# Patient Record
Sex: FEMALE | Race: WHITE | Employment: UNEMPLOYED | URBAN - METROPOLITAN AREA
[De-identification: names, ages, dates, MRNs, and addresses within clinical notes are randomized per-mention and may not be internally consistent; named-entity substitution may affect disease eponyms.]

---

## 2018-04-12 ENCOUNTER — OFFICE VISIT (OUTPATIENT)
Dept: NEUROLOGY | Age: 58
End: 2018-04-12
Payer: MEDICAID

## 2018-04-12 VITALS
DIASTOLIC BLOOD PRESSURE: 79 MMHG | OXYGEN SATURATION: 94 % | SYSTOLIC BLOOD PRESSURE: 126 MMHG | BODY MASS INDEX: 44.65 KG/M2 | HEART RATE: 83 BPM | RESPIRATION RATE: 18 BRPM | HEIGHT: 65 IN | WEIGHT: 268 LBS

## 2018-04-12 DIAGNOSIS — M54.17 L-S RADICULOPATHY: Primary | ICD-10-CM

## 2018-04-12 PROCEDURE — 99213 OFFICE O/P EST LOW 20 MIN: CPT | Performed by: NURSE PRACTITIONER

## 2018-04-12 RX ORDER — PREGABALIN 100 MG/1
100 CAPSULE ORAL 2 TIMES DAILY
Qty: 60 CAPSULE | Refills: 2 | Status: SHIPPED | OUTPATIENT
Start: 2018-04-12 | End: 2018-08-28 | Stop reason: SDUPTHER

## 2018-06-14 ENCOUNTER — TELEPHONE (OUTPATIENT)
Dept: NEUROLOGY | Age: 58
End: 2018-06-14

## 2018-07-12 ENCOUNTER — OFFICE VISIT (OUTPATIENT)
Dept: SURGERY | Age: 58
End: 2018-07-12
Payer: MEDICAID

## 2018-07-12 VITALS
DIASTOLIC BLOOD PRESSURE: 72 MMHG | OXYGEN SATURATION: 95 % | RESPIRATION RATE: 16 BRPM | SYSTOLIC BLOOD PRESSURE: 112 MMHG | BODY MASS INDEX: 44.48 KG/M2 | HEART RATE: 87 BPM | HEIGHT: 65 IN | WEIGHT: 267 LBS

## 2018-07-12 DIAGNOSIS — R19.7 DIARRHEA, UNSPECIFIED TYPE: ICD-10-CM

## 2018-07-12 DIAGNOSIS — K21.9 GASTROESOPHAGEAL REFLUX DISEASE WITHOUT ESOPHAGITIS: ICD-10-CM

## 2018-07-12 DIAGNOSIS — K21.9 GASTROESOPHAGEAL REFLUX DISEASE, ESOPHAGITIS PRESENCE NOT SPECIFIED: ICD-10-CM

## 2018-07-12 PROCEDURE — 1036F TOBACCO NON-USER: CPT | Performed by: SURGERY

## 2018-07-12 PROCEDURE — G8417 CALC BMI ABV UP PARAM F/U: HCPCS | Performed by: SURGERY

## 2018-07-12 PROCEDURE — 3017F COLORECTAL CA SCREEN DOC REV: CPT | Performed by: SURGERY

## 2018-07-12 PROCEDURE — G8427 DOCREV CUR MEDS BY ELIG CLIN: HCPCS | Performed by: SURGERY

## 2018-07-12 PROCEDURE — 99202 OFFICE O/P NEW SF 15 MIN: CPT | Performed by: SURGERY

## 2018-07-12 PROCEDURE — 99214 OFFICE O/P EST MOD 30 MIN: CPT | Performed by: SURGERY

## 2018-07-12 RX ORDER — OMEPRAZOLE 40 MG/1
40 CAPSULE, DELAYED RELEASE ORAL DAILY
COMMUNITY
End: 2018-07-12 | Stop reason: SDUPTHER

## 2018-07-12 RX ORDER — IBUPROFEN 800 MG/1
800 TABLET ORAL EVERY 6 HOURS PRN
COMMUNITY

## 2018-07-12 RX ORDER — OMEPRAZOLE 40 MG/1
40 CAPSULE, DELAYED RELEASE ORAL DAILY
Qty: 30 CAPSULE | Refills: 11 | Status: SHIPPED | OUTPATIENT
Start: 2018-07-12 | End: 2019-07-09 | Stop reason: SDUPTHER

## 2018-07-12 RX ORDER — DOCUSATE SODIUM 100 MG/1
200 CAPSULE, LIQUID FILLED ORAL 2 TIMES DAILY
Qty: 120 CAPSULE | Refills: 11 | Status: SHIPPED | OUTPATIENT
Start: 2018-07-12 | End: 2018-08-11

## 2018-07-12 NOTE — PROGRESS NOTES
[address] [facility]    General Surgery Attending History and Physical    Patient's Name/Date of Birth: Eduardo Pollock 1960 (45 y.o.)    Date: 2018     CC: chronic GERD    HPI:  60 yo complains of GERD that is long standing. She notes that spicy foods makes the pain worse. The patient reported  Acute, intermittent epigastric pain localized to the abdomen that started years ago. The intensity of the pain is severe when the pain occurs. There are no alleviating   factors regarding the pain. She only takes prilosec 40 mg intermittently but it helps when she takes it. She last had an upper endoscopy in . She has intermittent constipation. She takes over the counter medications occaasionaly. She alternates between constipation and diarrhea. When she is constipated, her hemorrhoids worsen. Past Medical History:   Diagnosis Date    Chronic UTI     Colon polyps [id number]    Tubular adenoma in     Hyperlipidemia     Hypertension     Hypothyroidism     PONV (postoperative nausea and vomiting)     after epidural- nausea, vomiting and headache    Type II or unspecified type diabetes mellitus without mention of complication, not stated as uncontrolled     NO MED    Urinary incontinence        Past Surgical History:   Procedure Laterality Date    ANKLE SURGERY      fracture  pins used    ANKLE SURGERY      times 3 rt ankle    BREAST SURGERY      reduction and reconstruction both breasts     SECTION      CHOLECYSTECTOMY      COLONOSCOPY      Diverticulosis, one polyp - tubular adenoma. Gets every 2 years     COLONOSCOPY      Polyps.  Needs reepat in     COLONOSCOPY  14    LUMBAR LAMINECTOMY  2016    L4-5    TUBAL LIGATION      UPPER GASTROINTESTINAL ENDOSCOPY      Hiatal hernia    UPPER GASTROINTESTINAL ENDOSCOPY  2012    Hiatal hernia    UPPER GASTROINTESTINAL ENDOSCOPY  14    UPPER GASTROINTESTINAL ENDOSCOPY  4/6/15    
Aidee Gallardo is scheduled for an EGD & Colonoscopy with Dr Delvis Masterson on 7/26/18 @ 10:00am @ [facility]. Patient has been notified of the appointment and a letter has been mailed and/or given to patient in clinic. Patient has also been given verbal instruction to stop blood thinners 5 days before, take blood pressure medicine the morning before with a small sip of water. Patient has been told to make sure they have a ride and to park in the [facility] and report through the outpatient entrance of the hospital facing [place] for same day surgery.   .Electronically signed by Murtaza Garcia on 7/12/18 at 1:38 PM
 COLONOSCOPY  2012    Polyps. Needs reepat in 2014    COLONOSCOPY  4/16/14    LUMBAR LAMINECTOMY  03/22/2016    L4-5    TUBAL LIGATION      UPPER GASTROINTESTINAL ENDOSCOPY  2008    Hiatal hernia    UPPER GASTROINTESTINAL ENDOSCOPY  2012    Hiatal hernia    UPPER GASTROINTESTINAL ENDOSCOPY  4/16/14    UPPER GASTROINTESTINAL ENDOSCOPY  4/6/15    DR Patricia Caceres       Prior to Admission medications    Medication Sig Start Date End Date Taking? Authorizing Provider   omeprazole (PRILOSEC) 40 MG delayed release capsule Take 40 mg by mouth daily   Yes Historical Provider, MD   ibuprofen (ADVIL;MOTRIN) 800 MG tablet Take 800 mg by mouth every 6 hours as needed for Pain   Yes Historical Provider, MD   pregabalin (LYRICA) 100 MG capsule Take 1 capsule by mouth 2 times daily for 90 days. . 4/12/18 7/12/18 Yes RIK Garcia - CNP   furosemide (LASIX) 20 MG tablet Take 20 mg by mouth daily   Yes Historical Provider, MD   metFORMIN (GLUCOPHAGE) 500 MG tablet Take 500 mg by mouth 2 times daily (with meals)   Yes Historical Provider, MD   levothyroxine (SYNTHROID) 100 MCG tablet Take 1 tablet by mouth daily. Take with water on an empty stomach- wait 30 minutes before eating or taking other meds. 4/11/14  Yes Deepti Frias MD   enalapril (VASOTEC) 10 MG tablet Take 1 tablet by mouth daily.  4/11/14  Yes Eryn Osei MD       No Known Allergies    Family History   Problem Relation Age of Onset    Diabetes Mother     High Blood Pressure Mother     Thyroid Disease Mother     Diabetes Father     Heart Disease Father     Thyroid Disease Daughter     Seizures Daughter        Social History   Substance Use Topics    Smoking status: Never Smoker    Smokeless tobacco: Never Used    Alcohol use No         Review of Systems - History obtained from the patient  General ROS: see above  Psychological ROS: negative  Ophthalmic ROS: negative  ENT ROS: negative  Allergy and Immunology ROS:

## 2018-07-12 NOTE — PATIENT INSTRUCTIONS
pressure medications or heart medications, you should take them with a sip of water.  If you are on INSULIN or OTHER DIABETIC MEDICATIONS then check with your primary care physician as to how to adjust your medication while on clear liquid diet and when nothing by mouth. Instructions for Clear liquid diet  Definition  A clear liquid diet consists of clear liquids, such as water, broth and plain gelatin, that are easily digested and leave no undigested residue in your intestinal tract. Your doctor may prescribe a clear liquid diet before certain medical procedures or if you have certain digestive problems. Because a clear liquid diet can't provide you with adequate calories and nutrients, it shouldn't be continued for more than a few days. Purpose  A clear liquid diet is often used before tests, procedures or surgeries that require no food in your stomach or intestines, such as before colonoscopy. It may also be recommended as a short-term diet if you have certain digestive problems, such as nausea, vomiting or diarrhea, or after certain types of surgery. Diet details  A clear liquid diet helps maintain adequate hydration, provides some important electrolytes, such as sodium and potassium, and gives some energy at a time when a full diet isn't possible or recommended. The following foods are allowed in a clear liquid diet:   Plain water   Fruit juices without pulp, such as apple juice, grape juice or cranberry juice   Strained lemonade or fruit punch   Clear, fat-free broth (bouillon or consomme)   Clear sodas   Plain gelatin   Honey   Ice pops without bits of fruit or fruit pulp   Tea or coffee without milk or cream    Any foods not on the above list should be avoided. Also, for certain tests, such as colon exams, your doctor may ask you to avoid liquids or gelatin with red coloring.      A typical menu on the clear liquid diet may look like this:     Breakfast:  1 glass fruit juice  1 cup coffee or Difficulty swallowing   Blood in stool or vomit   Abnormal x-ray or other examinations of the gastrointestinal tract     Conditions that can be diagnosed with upper GI endoscopy include:   Ulcers   Tumors   Polyps   Abnormal narrowing   Inflammation     Possible Complications   Complications are rare, but no procedure is completely free of risk. If you are planning to have upper GI endoscopy, your doctor will review a list of possible complications, which may include:   Bleeding   Damage to the esophagus, stomach, or intestine   Infection   Respiratory depression (reduced breathing rate and/or depth)   Reaction to sedatives or anesthesia causing your blood pressure to drop    Some factors that may increase the risk of complications include:   Age: 61 or older   Pregnancy   Obesity   Smoking , alcoholism , or drug use   Malnutrition   Recent illness   Diabetes   Heart or lung problems   Bleeding disorders   Use of certain medicines     Be sure to discuss these risks with your doctor before the test.     What to Expect   Prior to test   Leading up to the test:   Arrange for a ride home after the test. Also, arrange for help at home. The night before, eat a light meal.  Do not eat or drink anything after midnight the night before the test.   Talk to your doctor about your medicines. You may be asked to stop taking some medicines up to one week before the procedure, like:   Anti-inflammatory drugs (e.g., aspirin )   Blood thinners, like clopidogrel (Plavix) or warfarin (Coumadin)     Description of the Test   You will be asked to lie on your left side. You will have monitors tracking your breathing, heart rate, and blood oxygen levels. You will be given supplemental oxygen to breathe through your nose. A mouthpiece will be positioned to help keep your mouth open. Your throat may be sprayed with a numbing medicine.  You will be given a sedative through an IV to help you relax during the test.  During the test, a small suction tube will be used to clear saliva and fluids from your mouth. The endoscope will be lubricated and placed in your mouth. You will be asked to try to swallow it. Then, it will be carefully and slowly advanced down your throat. It will be passed through your esophagus and into your stomach and intestine. While the endoscope is being advanced, your doctor will view the images on the screen. Air will be passed through the endoscope into your digestive tract. This will be done to smooth the normal folds in the tissues, allowing your doctor to view the tissue more easily. Tiny tools may be passed through the endoscope in order to take biopsies or do other tests. After Test   After the test, you will be observed for an hour. Then, you will be allowed to go home. When you return home after the test, do the following to help ensure a smooth recovery:   Rest when you get home. Ask your doctor if you can resume your normal diet. In most cases, you will be able to. Sedatives can slow your reaction time. Do not drive or use machinery for the rest of the day. Avoid alcohol for the rest of the day. Be sure to follow your doctor's instructions . How Long Will It Take? Usually about 10-15 minutes     Will It Hurt? Most people do not feel anything during the test and will not remember the test.  After the test, your throat may be sore and you may feel bloated. Results   This test gives your doctor information about the health of your digestive system. The results can help to explain your symptoms. You and your doctor will talk about the results and your treatment plan.      Call Your Doctor   After the test, call your doctor if any of the following occurs:   Signs of infection, including fever and chills   Severe abdominal pain   Hard, swollen abdomen   Difficulty swallowing or breathing   Any change or increase in your original symptoms   Bloody or black tarry colored stools   Nausea and/or vomiting   Cough, shortness of breath, or chest pain   Bleeding     In case of emergency, call 911. Colonoscopy     Definition   A colonoscopy is the visual exam of the rectum and colon (large intestine). The exam is done with a tool called a colonoscope. The colonoscope is a flexible tube with a tiny camera on the end. This instrument allows the doctor to view the inside of your rectum and colon. Colonoscopy        [facility].   Reasons for Procedure   It is used to examine, diagnose, and treat problems in your large intestine. The procedure is most often done for the following reasons:   · To determine the cause of abdominal pain, rectal bleeding, or a change in bowel habits   · To detect and treat colon cancer or colon polyps   · To obtain tissue samples for testing   · To stop intestinal bleeding   · Monitor response to treatment if you have inflammatory bowel disease   Possible Complications   Complications are rare, but no procedure is completely free of risk. If you are planning to have a colonoscopy, your doctor will review a list of possible complications, which may include:   · Bleeding   · Perforation or puncture of the bowel   Factors that may increase the risk of complications include:   · Pre-existing heart or kidney condition   · Treatment with certain medicines, including aspirin and other drugs with anticoagulant or blood-thinning properties   · Prior abdominal surgery or radiation treatments   · Active colitis , diverticulitis , or other acute bowel disease   · Previous treatment with radiation therapy   Be sure to discuss these risks with your doctor before the procedure. What to Expect   Prior to Procedure   Your doctor will likely do the following:   · Physical exam   · Health history   · Review of medicines   · Test your stool for hidden blood (called \"occult blood\")   Your colon must be completely clean before the procedure.  Any stool left in the intestine will with the passing of gas. Post-procedure Care   If any tissue was removed:   · It will be sent to a lab to be examined. It may take 1-2 weeks for results. The doctor will usually give an initial report after the scope is removed. Other tests may be recommended. · A small amount of bleeding may occur during the first few days after the procedure. When you return home after the procedure, be sure to follow your doctor's instructions, which may include:   · Resume medicines as instructed by your doctor. · Resume normal diet, unless directed otherwise by your doctor. · The sedative will make you drowsy. Avoid driving, operating machinery, or making important decisions for the rest of the day. · Rest for the remainder of the day. Call Your Doctor   After arriving home, contact your doctor if any of the following occurs:   · Bleeding from your rectumNotify your doctor if you pass a teaspoonful of blood or more. · Black, tarry stools   · Severe abdominal pain   · Hard, swollen abdomen   · Signs of infection, including fever or chills   · Inability to pass gas or stool   · Coughing, shortness of breath, chest pain, severe nausea or vomiting   In case of an emergency, CALL 911 .

## 2018-07-17 ENCOUNTER — TELEPHONE (OUTPATIENT)
Dept: SURGERY | Age: 58
End: 2018-07-17

## 2018-07-26 ENCOUNTER — ANESTHESIA (OUTPATIENT)
Dept: ENDOSCOPY | Age: 58
End: 2018-07-26
Payer: MEDICAID

## 2018-07-26 ENCOUNTER — HOSPITAL ENCOUNTER (OUTPATIENT)
Age: 58
Setting detail: OUTPATIENT SURGERY
Discharge: HOME | End: 2018-07-26
Attending: SURGERY | Admitting: SURGERY
Payer: MEDICAID

## 2018-07-26 ENCOUNTER — ANESTHESIA EVENT (OUTPATIENT)
Dept: ENDOSCOPY | Age: 58
End: 2018-07-26
Payer: MEDICAID

## 2018-07-26 VITALS
SYSTOLIC BLOOD PRESSURE: 128 MMHG | DIASTOLIC BLOOD PRESSURE: 70 MMHG | TEMPERATURE: 98 F | OXYGEN SATURATION: 98 % | HEIGHT: 65 IN | RESPIRATION RATE: 16 BRPM | BODY MASS INDEX: 44.48 KG/M2 | HEART RATE: 64 BPM | WEIGHT: 267 LBS

## 2018-07-26 VITALS — DIASTOLIC BLOOD PRESSURE: 56 MMHG | OXYGEN SATURATION: 95 % | SYSTOLIC BLOOD PRESSURE: 116 MMHG

## 2018-07-26 DIAGNOSIS — Z01.818 PREOP TESTING: Primary | ICD-10-CM

## 2018-07-26 LAB — METER GLUCOSE: 128 MG/DL (ref 70–110)

## 2018-07-26 PROCEDURE — 3700000000 HC ANESTHESIA ATTENDED CARE: Performed by: SURGERY

## 2018-07-26 PROCEDURE — 2580000003 HC RX 258: Performed by: SURGERY

## 2018-07-26 PROCEDURE — 88342 IMHCHEM/IMCYTCHM 1ST ANTB: CPT

## 2018-07-26 PROCEDURE — 6360000002 HC RX W HCPCS

## 2018-07-26 PROCEDURE — 88305 TISSUE EXAM BY PATHOLOGIST: CPT

## 2018-07-26 PROCEDURE — 7100000011 HC PHASE II RECOVERY - ADDTL 15 MIN: Performed by: SURGERY

## 2018-07-26 PROCEDURE — 45378 DIAGNOSTIC COLONOSCOPY: CPT | Performed by: SURGERY

## 2018-07-26 PROCEDURE — 3609009500 HC COLONOSCOPY DIAGNOSTIC OR SCREENING: Performed by: SURGERY

## 2018-07-26 PROCEDURE — 3700000001 HC ADD 15 MINUTES (ANESTHESIA): Performed by: SURGERY

## 2018-07-26 PROCEDURE — 3609012400 HC EGD TRANSORAL BIOPSY SINGLE/MULTIPLE: Performed by: SURGERY

## 2018-07-26 PROCEDURE — 43239 EGD BIOPSY SINGLE/MULTIPLE: CPT | Performed by: SURGERY

## 2018-07-26 PROCEDURE — 82962 GLUCOSE BLOOD TEST: CPT

## 2018-07-26 PROCEDURE — 7100000010 HC PHASE II RECOVERY - FIRST 15 MIN: Performed by: SURGERY

## 2018-07-26 RX ORDER — FENTANYL CITRATE 50 UG/ML
INJECTION, SOLUTION INTRAMUSCULAR; INTRAVENOUS PRN
Status: DISCONTINUED | OUTPATIENT
Start: 2018-07-26 | End: 2018-07-26 | Stop reason: SDUPTHER

## 2018-07-26 RX ORDER — 0.9 % SODIUM CHLORIDE 0.9 %
10 VIAL (ML) INJECTION PRN
Status: DISCONTINUED | OUTPATIENT
Start: 2018-07-26 | End: 2018-07-26 | Stop reason: HOSPADM

## 2018-07-26 RX ORDER — MIDAZOLAM HYDROCHLORIDE 1 MG/ML
INJECTION INTRAMUSCULAR; INTRAVENOUS PRN
Status: DISCONTINUED | OUTPATIENT
Start: 2018-07-26 | End: 2018-07-26 | Stop reason: SDUPTHER

## 2018-07-26 RX ORDER — 0.9 % SODIUM CHLORIDE 0.9 %
10 VIAL (ML) INJECTION PRN
Status: DISCONTINUED | OUTPATIENT
Start: 2018-07-26 | End: 2018-07-26 | Stop reason: SDUPTHER

## 2018-07-26 RX ORDER — 0.9 % SODIUM CHLORIDE 0.9 %
10 VIAL (ML) INJECTION EVERY 12 HOURS SCHEDULED
Status: DISCONTINUED | OUTPATIENT
Start: 2018-07-26 | End: 2018-07-26 | Stop reason: HOSPADM

## 2018-07-26 RX ORDER — SODIUM CHLORIDE 9 MG/ML
INJECTION, SOLUTION INTRAVENOUS CONTINUOUS
Status: DISCONTINUED | OUTPATIENT
Start: 2018-07-26 | End: 2018-07-26 | Stop reason: SDUPTHER

## 2018-07-26 RX ORDER — 0.9 % SODIUM CHLORIDE 0.9 %
10 VIAL (ML) INJECTION EVERY 12 HOURS SCHEDULED
Status: DISCONTINUED | OUTPATIENT
Start: 2018-07-26 | End: 2018-07-26 | Stop reason: SDUPTHER

## 2018-07-26 RX ORDER — PROPOFOL 10 MG/ML
INJECTION, EMULSION INTRAVENOUS PRN
Status: DISCONTINUED | OUTPATIENT
Start: 2018-07-26 | End: 2018-07-26 | Stop reason: SDUPTHER

## 2018-07-26 RX ORDER — SODIUM CHLORIDE 9 MG/ML
INJECTION, SOLUTION INTRAVENOUS CONTINUOUS
Status: DISCONTINUED | OUTPATIENT
Start: 2018-07-26 | End: 2018-07-26 | Stop reason: HOSPADM

## 2018-07-26 RX ADMIN — PROPOFOL 40 MG: 10 INJECTION, EMULSION INTRAVENOUS at 11:39

## 2018-07-26 RX ADMIN — FENTANYL CITRATE 50 MCG: 50 INJECTION, SOLUTION INTRAMUSCULAR; INTRAVENOUS at 11:43

## 2018-07-26 RX ADMIN — FENTANYL CITRATE 50 MCG: 50 INJECTION, SOLUTION INTRAMUSCULAR; INTRAVENOUS at 11:39

## 2018-07-26 RX ADMIN — PROPOFOL 40 MG: 10 INJECTION, EMULSION INTRAVENOUS at 11:54

## 2018-07-26 RX ADMIN — PROPOFOL 40 MG: 10 INJECTION, EMULSION INTRAVENOUS at 11:49

## 2018-07-26 RX ADMIN — PROPOFOL 40 MG: 10 INJECTION, EMULSION INTRAVENOUS at 11:41

## 2018-07-26 RX ADMIN — PROPOFOL 40 MG: 10 INJECTION, EMULSION INTRAVENOUS at 12:04

## 2018-07-26 RX ADMIN — PROPOFOL 40 MG: 10 INJECTION, EMULSION INTRAVENOUS at 12:00

## 2018-07-26 RX ADMIN — MIDAZOLAM HYDROCHLORIDE 2 MG: 1 INJECTION, SOLUTION INTRAMUSCULAR; INTRAVENOUS at 11:39

## 2018-07-26 RX ADMIN — SODIUM CHLORIDE: 9 INJECTION, SOLUTION INTRAVENOUS at 11:35

## 2018-07-26 RX ADMIN — PROPOFOL 40 MG: 10 INJECTION, EMULSION INTRAVENOUS at 11:43

## 2018-07-26 RX ADMIN — PROPOFOL 40 MG: 10 INJECTION, EMULSION INTRAVENOUS at 11:46

## 2018-07-26 ASSESSMENT — PAIN DESCRIPTION - LOCATION: LOCATION: ABDOMEN

## 2018-07-26 ASSESSMENT — PAIN SCALES - GENERAL
PAINLEVEL_OUTOF10: 0
PAINLEVEL_OUTOF10: 0
PAINLEVEL_OUTOF10: 4

## 2018-07-26 ASSESSMENT — PAIN - FUNCTIONAL ASSESSMENT: PAIN_FUNCTIONAL_ASSESSMENT: 0-10

## 2018-07-26 ASSESSMENT — PAIN DESCRIPTION - PAIN TYPE: TYPE: SURGICAL PAIN

## 2018-07-26 ASSESSMENT — LIFESTYLE VARIABLES: SMOKING_STATUS: 0

## 2018-07-26 ASSESSMENT — PAIN DESCRIPTION - DESCRIPTORS: DESCRIPTORS: CRAMPING

## 2018-07-26 ASSESSMENT — PAIN DESCRIPTION - FREQUENCY: FREQUENCY: CONTINUOUS

## 2018-07-26 NOTE — ANESTHESIA PRE PROCEDURE
Department of Anesthesiology  Preprocedure Note       Name:  Deena Tamayo   Age:  59 y.o.  :  1960                                          MRN:  [id number]         Date:  2018      Surgeon: Yael Boyd):  Shayla Lopez MD    Procedure: Procedure(s):  EGD ESOPHAGOGASTRODUODENOSCOPY  COLONOSCOPY LOW  SCREENING    Medications prior to admission:   Prior to Admission medications    Medication Sig Start Date End Date Taking? Authorizing Provider   ibuprofen (ADVIL;MOTRIN) 800 MG tablet Take 800 mg by mouth every 6 hours as needed for Pain   Yes Historical Provider, MD   omeprazole (PRILOSEC) 40 MG delayed release capsule Take 1 capsule by mouth daily 18 Yes Shayla Lopez MD   docusate sodium (COLACE) 100 MG capsule Take 2 capsules by mouth 2 times daily 18 Yes Shayla Lopez MD   pregabalin (LYRICA) 100 MG capsule Take 1 capsule by mouth 2 times daily for 90 days. . 18 Yes RIK Dumont - CNP   furosemide (LASIX) 20 MG tablet Take 20 mg by mouth daily   Yes Historical Provider, MD   metFORMIN (GLUCOPHAGE) 500 MG tablet Take 500 mg by mouth 2 times daily (with meals)   Yes Historical Provider, MD   levothyroxine (SYNTHROID) 100 MCG tablet Take 1 tablet by mouth daily. Take with water on an empty stomach- wait 30 minutes before eating or taking other meds. 14  Yes Deepti Lopez MD   enalapril (VASOTEC) 10 MG tablet Take 1 tablet by mouth daily.  14  Yes Deepti Lopez MD       Current medications:    Current Facility-Administered Medications   Medication Dose Route Frequency Provider Last Rate Last Dose    0.9 % sodium chloride infusion   Intravenous Continuous Shayla Lopez MD        sodium chloride (PF) 0.9 % injection 10 mL  10 mL Intravenous PRN Shayla Lopez MD        sodium chloride (PF) 0.9 % injection 10 mL  10 mL Intravenous 2 times per day Shayla Lopez MD           Allergies:  No Known Allergies    Problem List:    Patient Active Problem List   Diagnosis Code    Hyperlipidemia with target LDL less than 100 E78.5    Type 2 diabetes mellitus (HCC) E11.9    Hypothyroidism E03.9    Colon polyps K63.5    Incontinence of urine R32    Glucose intolerance (impaired glucose tolerance) R73.02    HTN, goal below 140/90 I10    Rib pain on left side R07.81    UTI (urinary tract infection) N39.0    Spinal stenosis M48.00    Multiple adenomatous polyps D36.9    Hiatal hernia K44.9    Gastroesophageal reflux disease without esophagitis K21.9       Past Medical History:        Diagnosis Date    Chronic UTI     Colon polyps [id number]    Tubular adenoma in     Gastroesophageal reflux disease without esophagitis 2018    Hyperlipidemia     Hypertension     Hypothyroidism     PONV (postoperative nausea and vomiting)     after epidural- nausea, vomiting and headache    Type II or unspecified type diabetes mellitus without mention of complication, not stated as uncontrolled     NO MED    Urinary incontinence        Past Surgical History:        Procedure Laterality Date    ANKLE SURGERY      fracture  pins used    ANKLE SURGERY      times 3 rt ankle    BREAST SURGERY      reduction and reconstruction both breasts     SECTION      CHOLECYSTECTOMY      COLONOSCOPY      Diverticulosis, one polyp - tubular adenoma. Gets every 2 years     COLONOSCOPY      Polyps.  Needs reepat in     COLONOSCOPY  14    LUMBAR LAMINECTOMY  2016    L4-5    TUBAL LIGATION      UPPER GASTROINTESTINAL ENDOSCOPY      Hiatal hernia    UPPER GASTROINTESTINAL ENDOSCOPY      Hiatal hernia    UPPER GASTROINTESTINAL ENDOSCOPY  14    UPPER GASTROINTESTINAL ENDOSCOPY  4/6/15    DR Sj Zimmerman       Social History:    Social History   Substance Use Topics    Smoking status: Never Smoker    Smokeless tobacco: Never Used    Alcohol use No summary reviewed and Nursing notes reviewed   history of anesthetic complications: PONV. Airway: Mallampati: II  TM distance: >3 FB   Neck ROM: full  Mouth opening: > = 3 FB Dental: normal exam         Pulmonary: breath sounds clear to auscultation  (+) sleep apnea: on CPAP,      (-) not a current smoker                           Cardiovascular:    (+) hypertension:, hyperlipidemia        Rhythm: regular  Rate: normal                    Neuro/Psych:   (+) neuromuscular disease (states hx of neuropathy):,              ROS comment: Spinal stenosis. GI/Hepatic/Renal:   (+) hiatal hernia, GERD:,          ROS comment: Pre-op diagnosis: GERD, DIARRHEA. Endo/Other:    (+) DiabetesType II DM, well controlled, , hypothyroidism::., .                 Abdominal:   (+) obese,     Abdomen: soft. Vascular: negative vascular ROS. NPO since 2300, 07/25/2018  (Translation via hospital )     Anesthesia Plan      MAC     ASA 3     (22g right FA)  Induction: intravenous. Anesthetic plan and risks discussed with patient. Plan discussed with CRNA and attending. Lexie Muir RN   7/26/2018    Pt seen, examined, chart reviewed, plan discussed.   [facility]  7/26/2018  10:39 AM

## 2018-07-26 NOTE — ANESTHESIA POSTPROCEDURE EVALUATION
Department of Anesthesiology  Postprocedure Note    Patient: Syl Lorenz  MRN: [id number]  YOB: 1960  Date of evaluation: 7/27/2018  Time:  6:59 AM     Procedure Summary     Date:  07/26/18 Room / Location:  [facility] 02 / [facility] ENDOSCOPY    Anesthesia Start:   Anesthesia Stop:      Procedures:       EGD ESOPHAGOGASTRODUODENOSCOPY (N/A )      COLONOSCOPY LOW  SCREENING (N/A ) Diagnosis:  (GERD, DIARRHEA)    Surgeon:  Marizol Escobedo MD Responsible Provider:      Anesthesia Type:  MAC ASA Status:  3          Anesthesia Type: MAC    Micheline Phase I: Micheline Score: 10    Micheline Phase II: Micheline Score: 10    Last vitals: Reviewed and per EMR flowsheets.        Anesthesia Post Evaluation    Patient participation: complete - patient participated  Level of consciousness: awake  Airway patency: patent  Nausea & Vomiting: no nausea and no vomiting  Complications: no  Cardiovascular status: hemodynamically stable  Respiratory status: acceptable  Hydration status: stable

## 2018-07-26 NOTE — H&P
[address] [facility]    General Surgery Attending History and Physical    Patient's Name/Date of Birth: Marilee Guadalupe / 1960 (81 y.o.)    Date: 2018     CC: chronic GERD    HPI:  60 yo complains of GERD that is long standing. She notes that spicy foods makes the pain worse. The patient reported  Acute, intermittent epigastric pain localized to the abdomen that started years ago. The intensity of the pain is severe when the pain occurs. There are no alleviating   factors regarding the pain. She only takes prilosec 40 mg intermittently but it helps when she takes it. She last had an upper endoscopy in . She has intermittent constipation. She takes over the counter medications occaasionaly. She alternates between constipation and diarrhea. When she is constipated, her hemorrhoids worsen. Past Medical History:   Diagnosis Date    Chronic UTI     Colon polyps [id number]    Tubular adenoma in     Gastroesophageal reflux disease without esophagitis 2018    Hyperlipidemia     Hypertension     Hypothyroidism     PONV (postoperative nausea and vomiting)     after epidural- nausea, vomiting and headache    Type II or unspecified type diabetes mellitus without mention of complication, not stated as uncontrolled     NO MED    Urinary incontinence        Past Surgical History:   Procedure Laterality Date    ANKLE SURGERY      fracture  pins used    ANKLE SURGERY      times 3 rt ankle    BREAST SURGERY      reduction and reconstruction both breasts     SECTION      CHOLECYSTECTOMY      COLONOSCOPY      Diverticulosis, one polyp - tubular adenoma. Gets every 2 years     COLONOSCOPY      Polyps.  Needs reepat in     COLONOSCOPY  14    LUMBAR LAMINECTOMY  2016    L4-5    TUBAL LIGATION      UPPER GASTROINTESTINAL ENDOSCOPY      Hiatal hernia    UPPER GASTROINTESTINAL ENDOSCOPY  2012    Hiatal hernia    UPPER GASTROINTESTINAL

## 2018-08-28 DIAGNOSIS — M54.17 L-S RADICULOPATHY: ICD-10-CM

## 2018-08-28 RX ORDER — PREGABALIN 100 MG/1
100 CAPSULE ORAL 2 TIMES DAILY
Qty: 60 CAPSULE | Refills: 2 | Status: SHIPPED | OUTPATIENT
Start: 2018-08-28 | End: 2018-11-30 | Stop reason: SDUPTHER

## 2018-09-27 ENCOUNTER — TELEPHONE (OUTPATIENT)
Dept: NEUROLOGY | Age: 58
End: 2018-09-27

## 2018-11-07 ENCOUNTER — TELEPHONE (OUTPATIENT)
Dept: NEUROLOGY | Age: 58
End: 2018-11-07

## 2018-11-13 ENCOUNTER — TELEPHONE (OUTPATIENT)
Dept: NEUROLOGY | Age: 58
End: 2018-11-13

## 2018-11-13 PROBLEM — M54.50 CHRONIC LOW BACK PAIN: Chronic | Status: ACTIVE | Noted: 2018-11-13

## 2018-11-13 PROBLEM — Z98.890 HISTORY OF LUMBAR LAMINECTOMY: Chronic | Status: ACTIVE | Noted: 2018-11-13

## 2018-11-13 PROBLEM — M96.1 POSTLAMINECTOMY SYNDROME, LUMBAR REGION: Chronic | Status: ACTIVE | Noted: 2018-11-13

## 2018-11-13 PROBLEM — G89.29 CHRONIC LOW BACK PAIN: Chronic | Status: ACTIVE | Noted: 2018-11-13

## 2019-02-06 ENCOUNTER — OFFICE VISIT (OUTPATIENT)
Dept: NEUROLOGY | Age: 59
End: 2019-02-06
Payer: MEDICAID

## 2019-02-06 VITALS
HEART RATE: 101 BPM | OXYGEN SATURATION: 98 % | RESPIRATION RATE: 12 BRPM | DIASTOLIC BLOOD PRESSURE: 80 MMHG | HEIGHT: 65 IN | BODY MASS INDEX: 45.82 KG/M2 | SYSTOLIC BLOOD PRESSURE: 130 MMHG | WEIGHT: 275 LBS

## 2019-02-06 DIAGNOSIS — M54.5 CHRONIC BILATERAL LOW BACK PAIN, WITH SCIATICA PRESENCE UNSPECIFIED: Chronic | ICD-10-CM

## 2019-02-06 DIAGNOSIS — Z98.890 HISTORY OF LUMBAR LAMINECTOMY: Chronic | ICD-10-CM

## 2019-02-06 DIAGNOSIS — M96.1 POSTLAMINECTOMY SYNDROME, LUMBAR REGION: Primary | Chronic | ICD-10-CM

## 2019-02-06 DIAGNOSIS — E11.42 DIABETIC PERIPHERAL NEUROPATHY (HCC): ICD-10-CM

## 2019-02-06 DIAGNOSIS — G89.29 CHRONIC BILATERAL LOW BACK PAIN, WITH SCIATICA PRESENCE UNSPECIFIED: Chronic | ICD-10-CM

## 2019-02-06 PROCEDURE — 99215 OFFICE O/P EST HI 40 MIN: CPT | Performed by: PSYCHIATRY & NEUROLOGY

## 2019-02-06 PROCEDURE — 3046F HEMOGLOBIN A1C LEVEL >9.0%: CPT | Performed by: PSYCHIATRY & NEUROLOGY

## 2019-02-06 PROCEDURE — G8417 CALC BMI ABV UP PARAM F/U: HCPCS | Performed by: PSYCHIATRY & NEUROLOGY

## 2019-02-06 PROCEDURE — G8484 FLU IMMUNIZE NO ADMIN: HCPCS | Performed by: PSYCHIATRY & NEUROLOGY

## 2019-02-06 PROCEDURE — G8427 DOCREV CUR MEDS BY ELIG CLIN: HCPCS | Performed by: PSYCHIATRY & NEUROLOGY

## 2019-02-06 PROCEDURE — 1036F TOBACCO NON-USER: CPT | Performed by: PSYCHIATRY & NEUROLOGY

## 2019-02-06 PROCEDURE — 2022F DILAT RTA XM EVC RTNOPTHY: CPT | Performed by: PSYCHIATRY & NEUROLOGY

## 2019-02-06 PROCEDURE — 3017F COLORECTAL CA SCREEN DOC REV: CPT | Performed by: PSYCHIATRY & NEUROLOGY

## 2019-02-06 ASSESSMENT — ENCOUNTER SYMPTOMS
BACK PAIN: 1
GASTROINTESTINAL NEGATIVE: 1
RESPIRATORY NEGATIVE: 1
ALLERGIC/IMMUNOLOGIC NEGATIVE: 1
EYES NEGATIVE: 1

## 2019-07-09 RX ORDER — OMEPRAZOLE 40 MG/1
CAPSULE, DELAYED RELEASE ORAL
Qty: 30 CAPSULE | Refills: 0 | Status: SHIPPED | OUTPATIENT
Start: 2019-07-09 | End: 2019-08-06 | Stop reason: SDUPTHER

## 2020-08-04 ENCOUNTER — TELEPHONE (OUTPATIENT)
Dept: SURGERY | Age: 60
End: 2020-08-04

## 2020-08-04 RX ORDER — OMEPRAZOLE 40 MG/1
40 CAPSULE, DELAYED RELEASE ORAL DAILY
Qty: 90 CAPSULE | Refills: 11 | Status: SHIPPED | OUTPATIENT
Start: 2020-08-04 | End: 2020-09-03

## 2022-05-05 ENCOUNTER — TELEPHONE (OUTPATIENT)
Dept: SURGERY | Age: 62
End: 2022-05-05

## 2022-05-05 ENCOUNTER — OFFICE VISIT (OUTPATIENT)
Dept: SURGERY | Age: 62
End: 2022-05-05
Payer: MEDICAID

## 2022-05-05 VITALS
WEIGHT: 218 LBS | BODY MASS INDEX: 35.03 KG/M2 | SYSTOLIC BLOOD PRESSURE: 115 MMHG | DIASTOLIC BLOOD PRESSURE: 73 MMHG | OXYGEN SATURATION: 99 % | RESPIRATION RATE: 16 BRPM | TEMPERATURE: 97.8 F | HEART RATE: 75 BPM | HEIGHT: 66 IN

## 2022-05-05 DIAGNOSIS — D50.0 IRON DEFICIENCY ANEMIA DUE TO CHRONIC BLOOD LOSS: Primary | ICD-10-CM

## 2022-05-05 DIAGNOSIS — K59.01 CONSTIPATION BY DELAYED COLONIC TRANSIT: ICD-10-CM

## 2022-05-05 PROCEDURE — G8417 CALC BMI ABV UP PARAM F/U: HCPCS | Performed by: SURGERY

## 2022-05-05 PROCEDURE — 3017F COLORECTAL CA SCREEN DOC REV: CPT | Performed by: SURGERY

## 2022-05-05 PROCEDURE — 1036F TOBACCO NON-USER: CPT | Performed by: SURGERY

## 2022-05-05 PROCEDURE — 99202 OFFICE O/P NEW SF 15 MIN: CPT | Performed by: SURGERY

## 2022-05-05 PROCEDURE — 99204 OFFICE O/P NEW MOD 45 MIN: CPT | Performed by: SURGERY

## 2022-05-05 PROCEDURE — G8427 DOCREV CUR MEDS BY ELIG CLIN: HCPCS | Performed by: SURGERY

## 2022-05-05 RX ORDER — FERROUS SULFATE 325(65) MG
325 TABLET ORAL
COMMUNITY

## 2022-05-05 RX ORDER — METHOCARBAMOL 750 MG/1
TABLET ORAL
COMMUNITY
Start: 2022-03-21

## 2022-05-05 RX ORDER — SODIUM, POTASSIUM,MAG SULFATES 17.5-3.13G
1 SOLUTION, RECONSTITUTED, ORAL ORAL 2 TIMES DAILY
Qty: 2 EACH | Refills: 0 | Status: SHIPPED
Start: 2022-05-05 | End: 2022-05-05

## 2022-05-05 RX ORDER — ATORVASTATIN CALCIUM 20 MG
TABLET ORAL
COMMUNITY
Start: 2022-03-31

## 2022-05-05 RX ORDER — SUCRALFATE ORAL 1 G/10ML
1 SUSPENSION ORAL 4 TIMES DAILY
Qty: 1200 ML | Refills: 3 | Status: SHIPPED
Start: 2022-05-05 | End: 2022-09-16

## 2022-05-05 RX ORDER — POLYETHYLENE GLYCOL 3350 17 G/17G
17 POWDER, FOR SOLUTION ORAL DAILY
Qty: 1530 G | Refills: 11 | Status: SHIPPED | OUTPATIENT
Start: 2022-05-05 | End: 2022-06-04

## 2022-05-05 RX ORDER — SODIUM, POTASSIUM,MAG SULFATES 17.5-3.13G
1 SOLUTION, RECONSTITUTED, ORAL ORAL 2 TIMES DAILY
Qty: 1 EACH | Refills: 0 | Status: SHIPPED | OUTPATIENT
Start: 2022-05-05 | End: 2022-05-06

## 2022-05-05 NOTE — TELEPHONE ENCOUNTER
MA received a fax from [facility] Dr. Abdullahi Lang that Neita Flow is not covered. Gertrude Ortiz is covered, see media tab for fax sheet.   Electronically signed by Cyrus Esquivel MA on 5/5/22 at 1:11 PM EDT

## 2022-05-05 NOTE — PROGRESS NOTES
[facility]    General Surgery Attending History and Physical    Patient's Name/Date of Birth: Kayce Lloyd / 1960 (45 y.o.)    Date: May 5, 2022     CC:iron deficiency anemia    HPI:  60 yo who I last saw in 2018. Patient underwent Suanne Hang in 3/19/2019. She has lost 60 pounds. She now has anemia. She now receives IV iron at the cancer center. She has had 4 treatments in the past year. She has a follow up on May 23. Pt was taking omeprazole but she vomits it up    The patient reported  acute, constant epigastric pain localized to the area that started 1 year ago. The intensity of the pain is moderate. There are no alleviating or worsening factors regarding the pain. Pt complains of constipation  And bloating. This all worsened after the surgery    Past Medical History:   Diagnosis Date    Chronic low back pain 2018    Chronic UTI     Colon polyps [id number]    Tubular adenoma in     Gastroesophageal reflux disease without esophagitis 2018    History of lumbar laminectomy 2018    L4/5 level    Hyperlipidemia     Hypertension     Hypothyroidism     PONV (postoperative nausea and vomiting)     after epidural- nausea, vomiting and headache    Postlaminectomy syndrome, lumbar region 2018    Type II or unspecified type diabetes mellitus without mention of complication, not stated as uncontrolled     NO MED    Urinary incontinence        Past Surgical History:   Procedure Laterality Date    ANKLE SURGERY      fracture  pins used    ANKLE SURGERY      times 3 rt ankle    BREAST SURGERY      reduction and reconstruction both breasts     SECTION      CHOLECYSTECTOMY      COLONOSCOPY      Diverticulosis, one polyp - tubular adenoma. Gets every 2 years     COLONOSCOPY      Polyps.  Needs reepat in     COLONOSCOPY  14    LUMBAR LAMINECTOMY  2016    L4-5    ME COLONOSCOPY FLX DX W/COLLJ SPEC WHEN PFRMD N/A 2018 COLONOSCOPY LOW  SCREENING performed by Julissa Mina MD at [address] [place] [address] EGD TRANSORAL BIOPSY SINGLE/MULTIPLE N/A 7/26/2018    EGD BIOPSY performed by Julissa Mina MD at [place] [address]  2008    Hiatal hernia    UPPER GASTROINTESTINAL ENDOSCOPY  2012    Hiatal hernia    UPPER GASTROINTESTINAL ENDOSCOPY  4/16/14    UPPER GASTROINTESTINAL ENDOSCOPY  4/6/15    DR Bhatt How       Current Outpatient Medications   Medication Sig Dispense Refill    D3-50 1.25 MG (07640 UT) CAPS TAKE ONE CAPSULE BY MOUTH EVERY WEEK      ferrous sulfate (IRON 325) 325 (65 Fe) MG tablet Take 325 mg by mouth daily (with breakfast)      Multiple Vitamins-Iron (MULTI-VITAMIN/IRON PO) Take by mouth daily      levothyroxine (SYNTHROID) 100 MCG tablet Take 1 tablet by mouth daily. Take with water on an empty stomach- wait 30 minutes before eating or taking other meds. 30 tablet 4    LIPITOR 20 MG tablet TAKE 1 TABLET BY MOUTH DAILY FOR CHOLESTEROL      Ferrous Gluconate (IRON) 240 (27 Fe) MG TABS TAKE 1 TABLET BY MOUTH DAILY FOR IRON (Patient not taking: Reported on 5/5/2022)      omeprazole (PRILOSEC) 40 MG delayed release capsule Take 1 capsule by mouth daily (Patient not taking: Reported on 5/5/2022) 90 capsule 11    omeprazole (PRILOSEC) 40 MG delayed release capsule TAKE ONE CAPSULE BY MOUTH DAILY (Patient not taking: Reported on 5/5/2022) 30 capsule 11    OMEPRAZOLE PO Take 40 mg by mouth daily (Patient not taking: Reported on 5/5/2022)      Ergocalciferol (VITAMIN D2 PO) Take 50,000 Units by mouth once a week (Patient not taking: Reported on 5/5/2022)      VENTOLIN  (90 Base) MCG/ACT inhaler INL 2 PFS PO Q 4 TO 6 H PRN (Patient not taking: Reported on 5/5/2022)  0    pregabalin (LYRICA) 100 MG capsule Take 1 capsule by mouth 2 times daily. . (Patient not taking: Reported on 5/5/2022) 60 capsule 0    ibuprofen (ADVIL;MOTRIN) 800 MG tablet Take 800 mg by mouth every 6 hours as needed for Pain (Patient not taking: Reported on 5/5/2022)      furosemide (LASIX) 20 MG tablet Take 20 mg by mouth daily as needed  (Patient not taking: Reported on 5/5/2022)      metFORMIN (GLUCOPHAGE) 500 MG tablet Take 500 mg by mouth 2 times daily (with meals) (Patient not taking: Reported on 5/5/2022)      enalapril (VASOTEC) 10 MG tablet Take 1 tablet by mouth daily. (Patient not taking: Reported on 5/5/2022) 30 tablet 4     No current facility-administered medications for this visit. No Known Allergies    Family History   Problem Relation Age of Onset    Diabetes Mother     High Blood Pressure Mother     Thyroid Disease Mother     Diabetes Father     Heart Disease Father     Thyroid Disease Daughter     Seizures Daughter        Social History     Socioeconomic History    Marital status:      Spouse name: Not on file    Number of children: Not on file    Years of education: Not on file    Highest education level: Not on file   Occupational History    Not on file   Tobacco Use    Smoking status: Never Smoker    Smokeless tobacco: Never Used   Vaping Use    Vaping Use: Never used   Substance and Sexual Activity    Alcohol use: No     Alcohol/week: 0.0 standard drinks    Drug use: No    Sexual activity: Yes   Other Topics Concern    Not on file   Social History Narrative    Not on file     Social Determinants of Health     Financial Resource Strain:     Difficulty of Paying Living Expenses: Not on file   Food Insecurity:     Worried About Running Out of Food in the Last Year: Not on file    Shante of Food in the Last Year: Not on file   Transportation Needs:     Lack of Transportation (Medical): Not on file    Lack of Transportation (Non-Medical):  Not on file   Physical Activity:     Days of Exercise per Week: Not on file    Minutes of Exercise per Session: Not on file   Stress:     Feeling of Stress : Not on file   Social Connections:     Frequency of Communication with Friends and Family: Not on file    Frequency of Social Gatherings with Friends and Family: Not on file    Attends Judaism Services: Not on file    Active Member of Clubs or Organizations: Not on file    Attends Club or Organization Meetings: Not on file    Marital Status: Not on file   Intimate Partner Violence:     Fear of Current or Ex-Partner: Not on file    Emotionally Abused: Not on file    Physically Abused: Not on file    Sexually Abused: Not on file   Housing Stability:     Unable to Pay for Housing in the Last Year: Not on file    Number of [place] in the Last Year: Not on file    Unstable Housing in the Last Year: Not on file       ROS:  Review of Systems - History obtained from the patient  General ROS: negative  Psychological ROS: negative  Ophthalmic ROS: negative  Allergy and Immunology ROS: negative  Hematological and Lymphatic ROS: pos for anemia  Endocrine ROS: negative  Breast ROS: negative  Respiratory ROS: negative  Cardiovascular ROS: negative  Gastrointestinal ROS: positive for - abdominal pain and gas/bloating  Genito-Urinary ROS: negative  Musculoskeletal ROS: negative        Physical Exam:  Vitals:    05/05/22 0916   BP: 115/73   Pulse: 75   Resp: 16   Temp: 97.8 °F (36.6 °C)   SpO2: 99%       PSYCH: mood and affect normal, alert and oriented x 3  CONSTITUTIONAL: No apparent distress, comfortable  EYES: Sclera white, pupils equal round and reactive to light  ENMT:  Hearing normal, trachea midline, ears externally intact  LYMPH: no lympadenopathy in neck. No lympadenopathy in groins  RESP: Breath sounds were clear and equal with no rales, wheezes, or rhonchi. Respiratory effort was normal with no retractions or use of accessory muscles. CV: Heart sounds were normal with a regular rate and rhythm. No pedal edema  GI/ Abdomen: The abdomen was soft and non distended.                      There was no tenderness, guarding, rebound, or rigidity. There was no                     masses, hepatosplenomegaly, or hernias. No inguinal hernias were noted on coughing and straining. Rectal -deferred  MSK: no clubbing/ no cyanosis/ gait normal       Assessment/Plan:    Iron deficiency anemia  Constipation/ bloating      I have reviewed the prior progress note from the patient's primary care provider visit      Pt does not tolerate omeprazole secondary to nausea  tums does not help. Will start carafate 10 cc qid   Plan on EGD to rule out marginal ulcer after gastric bypass  I have discussed the risks, benefits, and alternatives to esophagogastroduodenoscopy with possible biopsy with deep sedation with the patient. I have detailed the risks of deep sedation (hypotension, hypoxia) as well as complications of bleeding and perforation. The patient understands the above and agrees to proceed. -start miralax powder  Plan on colonoscopy  2d clear  I discussed the risks, benefits, and alternatives to colonoscopy with possible biopsy/cauterization/polylpectomy with deep sedation with the patient including the risks of deep sedation (hypotension, hypoxia), bleeding, and perforation (<1%). The patient understands the above and agrees to proceed. Lise Gutierrez MD, FACS  5/5/2022  9:43 AM     NOTE: This report was transcribed using voice recognition software. Every effort was made to ensure accuracy; however, inadvertent computerized transcription errors may be present.

## 2022-05-05 NOTE — PATIENT INSTRUCTIONS
Start miralax powder daily  Start carafate 10 ml 4 x per day    If you have any questions, please call Elisha at [address]    It is very important that you follow all of the instructions listed on this sheet carefully (they may be slightly different than the directions on the product that you purchase at the pharmacy) to ensure that your colon is adequately cleaned out or your risk of complications could be increased. 2 Days or More Before Endoscopy:  · Obtain SUPREP from the pharmacy. · Do not eat corn, tomatoes, peas or watermelon 3 to 5 days before procedure. · Start clear liquid diet 2 days before the procedure  · If you are on INSULIN or OTHER DIABETIC MEDICATIONS, then check with your primary care physician as to how to adjust your medication while on clear liquid diet and when nothing by mouth. 1 Day Before the Endoscopy:  · No solid food - only clear liquids (soup, jello, or juice that you can see through with no solid food) for breakfast, lunch and supper. DO NOT drink or eat anything that is red as it will turn the inside of the colon red and look like blood. · Have at least 8 oz or more of clear liquids for breakfast (7 am to 8 am) and lunch (11:30 am to 12:30 pm). · 1:00 pm Drink at least 8 oz of clear liquids. · 3:00 pm Drink at least 8 oz of clear liquids. · 4:00 pm Pour ONE (1) 6-ounce bottle of SUPREP liquid into the mixing container. Add cool drinking water to the 16-ounce line on the container and mix. Drink the 16 oz container of the SUPREP followed immediately by at least 8 oz of clear liquids. · 5:00 pm Drink at least 8 oz of clear liquids. · Can continue to take  clear liquids     Day of Endoscopy:  · 4 hours prior to scheduled time for colonoscopy, Pour SECOND 6-ounce bottle of SUPREP liquid into the mixing container.  Add cool drinking water to the 16-ounce line on the container and mix. Drink the 16 oz container of the SUPREP followed immediately by at least 8 oz of clear liquids. · STOP ALL CLEAR LIQUIDS 2 HOURS PRIOR TO SCHEDULED TIME  If any blood pressure medications or heart medications are due in the morning, you should take them with a sip of water. Instructions for Clear liquid diet  Definition  A clear liquid diet consists of clear liquids, such as water, broth and plain gelatin, that are easily digested and leave no undigested residue in your intestinal tract. Your doctor may prescribe a clear liquid diet before certain medical procedures or if you have certain digestive problems. Because a clear liquid diet can't provide you with adequate calories and nutrients, it shouldn't be continued for more than a few days. Purpose  A clear liquid diet is often used before tests, procedures or surgeries that require no food in your stomach or intestines, such as before colonoscopy. It may also be recommended as a short-term diet if you have certain digestive problems, such as nausea, vomiting or diarrhea, or after certain types of surgery. Diet details  A clear liquid diet helps maintain adequate hydration, provides some important electrolytes, such as sodium and potassium, and gives some energy at a time when a full diet isn't possible or recommended. The following foods are allowed in a clear liquid diet:    Plain water    Fruit juices without pulp, such as apple juice, grape juice or cranberry juice    Strained lemonade or fruit punch    Clear, fat-free broth (bouillon or consomme)    Clear sodas    Plain gelatin    Honey    Ice pops without bits of fruit or fruit pulp    Tea or coffee without milk or cream    Any foods not on the above list should be avoided. Also, for certain tests, such as colon exams, your doctor may ask you to avoid liquids or gelatin with red coloring.      A typical menu on the clear liquid diet may look like this: Breakfast:  1 glass fruit juice  1 cup coffee or tea (without dairy products)  1 cup broth  1 bowl gelatin     Snack:  1 glass fruit juice  1 bowl gelatin     Lunch:  1 glass fruit juice  1 glass water  1 cup broth  1 bowl gelatin     Snack:  1 ice pop (without fruit pulp)  1 cup coffee or tea (without dairy products) or a soft drink     Dinner:  1 cup juice or water  1 cup broth  1 bowl gelatin  1 cup coffee or tea     Results  Although the clear liquid diet may not be very exciting, it does fulfill its purpose. It's designed to keep your stomach and intestines clear, limit strain to your digestive system, but keep your body hydrated as you prepare for or recover from a medical procedure. Risks  Because a clear liquid diet can't provide you with adequate calories and nutrients, it shouldn't be used for more than a few days. Only use the clear liquid diet as directed by your doctor. If your doctor prescribes a clear liquid diet before a medical test, be sure to follow the diet instructions exactly. If you don't follow the diet exactly, you risk an inaccurate test and may have to reschedule the procedure for another time. The importance of proper hydration  A colonoscopy prep causes the body to lose a significant amount of fluid and can result in sickness due to dehydration. It's important that you prepare your body by drinking extra clear liquids before the prep. Stay hydrated by drinking all required clear liquids during the prep. Replenish your system by drinking clear liquids after returning home from your colonoscopy. Colonoscopy     Definition   A colonoscopy is the visual exam of the rectum and colon (large intestine). The exam is done with a tool called a colonoscope. The colonoscope is a flexible tube with a tiny camera on the end. This instrument allows the doctor to view the inside of your rectum and colon.      Colonoscopy        [address] [place] [address].   Reasons for Procedure   It is used to examine, diagnose, and treat problems in your large intestine. The procedure is most often done for the following reasons:   · To determine the cause of abdominal pain, rectal bleeding, or a change in bowel habits   · To detect and treat colon cancer or colon polyps   · To obtain tissue samples for testing   · To stop intestinal bleeding   · Monitor response to treatment if you have inflammatory bowel disease   Possible Complications   Complications are rare, but no procedure is completely free of risk. If you are planning to have a colonoscopy, your doctor will review a list of possible complications, which may include:   · Bleeding   · Perforation or puncture of the bowel   Factors that may increase the risk of complications include:   · Pre-existing heart or kidney condition   · Treatment with certain medicines, including aspirin and other drugs with anticoagulant or blood-thinning properties   · Prior abdominal surgery or radiation treatments   · Active colitis , diverticulitis , or other acute bowel disease   · Previous treatment with radiation therapy   Be sure to discuss these risks with your doctor before the procedure. What to Expect   Prior to Procedure   Your doctor will likely do the following:   · Physical exam   · Health history   · Review of medicines   · Test your stool for hidden blood (called \"occult blood\")   Your colon must be completely clean before the procedure. Any stool left in the intestine will block the view. This preparation may start several days before the procedure.  Follow your doctor's instructions, which may include any of the following cleansing methods:   · Enemas fluid introduced into the rectum to stimulate a bowel movement   · Laxativesmedicines that cause you to have soft bowel movements   · A clear-liquid diet   · Oral cathartic medicinesa large container of fluid to drink that stimulates a bowel movement   Leading up to your procedure:   · Talk to your doctor about your medicines. You may be asked to stop taking some medicines up to one week before the procedure, like:   ¨ Anti-inflammatory drugs (eg, aspirin )   ¨ Blood thinners like clopidogrel (Plavix) or warfarin (Coumadin)   ¨ Iron supplements or vitamins containing iron   · The night before, eat a light meal. Do not eat or drink anything after midnight. · Wear comfortable clothing. · If you have diabetes, ask your doctor if you need to adjust your insulin dose. · Arrange for a ride home after the procedure. Anesthesia   Your doctor may sedate you to decrease discomfort. Description of the Procedure   You will lie on your left side with knees bent and drawn up toward your chest. The colonoscope will be slowly inserted through the rectum and into the bowel. The colonoscope will inject air into the colon. A small attached video camera will allow the doctor to view the colon's lining on a screen. The doctor will continue guiding the tool through the bowel and assess the lining. A tissue sample or polyps may be removed during the procedure. How Long Will It Take? Less than one hour   Will It Hurt? Most people report some discomfort when the instrument is inserted. You may feel cramping, muscle spasms, or lower abdominal pain during the procedure. You may also feel the urge to move your bowels. Tell the doctor if you feel any severe pain. After the procedure, gas pains and cramping are common. These pains should go away with the passing of gas. Post-procedure Care   If any tissue was removed:   · It will be sent to a lab to be examined. It may take 1-2 weeks for results. The doctor will usually give an initial report after the scope is removed. Other tests may be recommended. · A small amount of bleeding may occur during the first few days after the procedure.    When you return home after the procedure, be sure to follow your doctor's instructions, which may include:   · Resume medicines as instructed by your doctor. · Resume normal diet, unless directed otherwise by your doctor. · The sedative will make you drowsy. Avoid driving, operating machinery, or making important decisions for the rest of the day. · Rest for the remainder of the day. Call Your Doctor   After arriving home, contact your doctor if any of the following occurs:   · Bleeding from your rectumNotify your doctor if you pass a teaspoonful of blood or more. · Black, tarry stools   · Severe abdominal pain   · Hard, swollen abdomen   · Signs of infection, including fever or chills   · Inability to pass gas or stool   · Coughing, shortness of breath, chest pain, severe nausea or vomiting   In case of an emergency, CALL 911 . Patient Information and Instructions for  Upper GI Endoscopy or Esophagogastroduodenoscopy [EGD])         Definition Upper GI Endoscopy or Esophagogastroduodenoscopy [EGD])  This is a test that uses a fiberoptic scope to examine the esophagus (throat), stomach, and upper part of the small intestines. Upper GI endoscopy may be recommended if you have:   Abdominal pain   Severe heartburn   Persistent nausea and vomiting   Difficulty swallowing   Blood in stool or vomit   Abnormal x-ray or other examinations of the gastrointestinal tract     Conditions that can be diagnosed with upper GI endoscopy include:   Ulcers   Tumors   Polyps   Abnormal narrowing   Inflammation     Possible Complications   Complications are rare, but no procedure is completely free of risk.  If you are planning to have upper GI endoscopy, your doctor will review a list of possible complications, which may include:   Bleeding   Damage to the esophagus, stomach, or intestine   Infection   Respiratory depression (reduced breathing rate and/or depth)   Reaction to sedatives or anesthesia causing your blood pressure to drop    Some factors that may increase the risk of complications include:   Age: 61 or older   Pregnancy Obesity   Smoking , alcoholism , or drug use   Malnutrition   Recent illness   Diabetes   Heart or lung problems   Bleeding disorders   Use of certain medicines     Be sure to discuss these risks with your doctor before the test.     What to Expect   Prior to test   Leading up to the test:   Arrange for a ride home after the test. Also, arrange for help at home. The night before, eat a light meal.  Do not eat or drink anything after midnight the night before the test.   Talk to your doctor about your medicines. You may be asked to stop taking some medicines up to one week before the procedure, like:   Anti-inflammatory drugs (e.g., aspirin )   Blood thinners, like clopidogrel (Plavix) or warfarin (Coumadin)     Description of the Test   You will be asked to lie on your left side. You will have monitors tracking your breathing, heart rate, and blood oxygen levels. You will be given supplemental oxygen to breathe through your nose. A mouthpiece will be positioned to help keep your mouth open. Your throat may be sprayed with a numbing medicine. You will be given a sedative through an IV to help you relax during the test.  During the test, a small suction tube will be used to clear saliva and fluids from your mouth. The endoscope will be lubricated and placed in your mouth. You will be asked to try to swallow it. Then, it will be carefully and slowly advanced down your throat. It will be passed through your esophagus and into your stomach and intestine. While the endoscope is being advanced, your doctor will view the images on the screen. Air will be passed through the endoscope into your digestive tract. This will be done to smooth the normal folds in the tissues, allowing your doctor to view the tissue more easily. Tiny tools may be passed through the endoscope in order to take biopsies or do other tests. After Test   After the test, you will be observed for an hour. Then, you will be allowed to go home. When you return home after the test, do the following to help ensure a smooth recovery:   Rest when you get home. Ask your doctor if you can resume your normal diet. In most cases, you will be able to. Sedatives can slow your reaction time. Do not drive or use machinery for the rest of the day. Avoid alcohol for the rest of the day. Be sure to follow your doctor's instructions . How Long Will It Take? Usually about 10-15 minutes     Will It Hurt? Most people do not feel anything during the test and will not remember the test.  After the test, your throat may be sore and you may feel bloated. Results   This test gives your doctor information about the health of your digestive system. The results can help to explain your symptoms. You and your doctor will talk about the results and your treatment plan. Call Your Doctor   After the test, call your doctor if any of the following occurs:   Signs of infection, including fever and chills   Severe abdominal pain   Hard, swollen abdomen   Difficulty swallowing or breathing   Any change or increase in your original symptoms   Bloody or black tarry colored stools   Nausea and/or vomiting   Cough, shortness of breath, or chest pain   Bleeding     In case of emergency, call 911.

## 2022-05-05 NOTE — TELEPHONE ENCOUNTER
Prior Authorization Form:      DEMOGRAPHICS:                     Patient Name:  Artemio Wong  Patient :  1960            Insurance:  Payor: [facility] / Plan: [place] [facility] / Product Type: *No Product type* /   Insurance ID Number:    Payor/Plan Subscr  Sex Relation Sub.  Ins. ID Effective Group Num   1. [address] 1960 Female Self [id number] 1/1/15 [id number]                                   [address]         DIAGNOSIS & PROCEDURE:                       Procedure/Operation: EGD/Colonoscopy         CPT Code: 90785,78294    Diagnosis:  Iron deficiency Anemia, Constipation    ICD10 Code: D50.0, K59.01    Location:  [facility]    Surgeon:  Indu Fisher MD    [facility] INFORMATION:                          Date: 2022    Time: 12:15p              Anesthesia:  MAC/TIVA                                                       Status:  Outpatient          Electronically signed by Pravin Roblero MA on 2022 at 12:39 PM

## 2022-05-06 RX ORDER — POLYETHYLENE GLYCOL 3350, SODIUM SULFATE ANHYDROUS, SODIUM BICARBONATE, SODIUM CHLORIDE, POTASSIUM CHLORIDE 236; 22.74; 6.74; 5.86; 2.97 G/4L; G/4L; G/4L; G/4L; G/4L
4 POWDER, FOR SOLUTION ORAL ONCE
Qty: 4000 ML | Refills: 0 | Status: SHIPPED | OUTPATIENT
Start: 2022-05-06 | End: 2022-05-06

## 2022-05-09 NOTE — TELEPHONE ENCOUNTER
Dr. Vivas Rocher sent Golytely. [address] en 2 días con Golytely o Nulytely  PREPARACIÓN DEL COLON PARA COLONOSCOPÍA O CIRUGÍA DE COLON    Es muy importante que siga todas las instrucciones que se indican en esta hoja con mucho cuidado (pueden ser algo diferentes de las instrucciones en el producto que compra en la farmacia) para asegurarse de que ferraro colon está adecuadamente limpio o el riesgo que corre de sufrir complicaciones podría aumentar.    2 días o más antes de la endoscopía:   Compre Golytely, Colyte o Nulytely, dependiendo de la receta médica que le sasha el cirujano, en la farmacia (llame a la farmacia con anticipación si va a pedir un producto con sabor para asegurarse de que lo tengan disponible).  Mezcle Golytely, Colyte o Nulytely según las instrucciones y guarde en el refrigerador.  No coma maíz, tomates, chícharos (guisantes) ni sandía 3 a 5 días antes del procedimiento.  Si está recibiendo INSULINA u OTROS MEDICAMENTOS PARA LA DIABETES, pregunte a ferraro médico de atención primaria cómo ajustar ferraro medicación cuando tiene que seguir alina dieta de líquidos transparentes y no puede comer nada. 1 día antes de la endoscopía:   Ninguna comida sólida: solo líquidos transparentes (sopa, gelatina o jugos a través de los cuales pueda edilberto, sin comidas sólidas) para el desayuno, almuerzo o evelyn. NO tome ni coma nada gonzalez, ya que hará que el interior del colon quede gonzalez y parezca kuldeep. No tome ni coma nada después de la medianoche.  Comience a sanjay la solución de Golytely, Colyte o Nulytely temprano en la tarde (entre la 1 pm y las 4 pm, cuanto más temprano, [place]). Lewis un vaso de 8 onzas de esta solución cada 10 minutos hasta que haya tomado 12 vasos. Es mejor sanjay todo el vaso rápidamente en vez de sanjay pequeños sorbos continuamente. Quedarán cuatro vasos de 8 onzas para sanjay en la mañana antes de ferraro endoscopía, guárdelos en el refrigerador.   Rosemarie Farnsworth 1945 movimientos intestinales deberían ocurrir aproximadamente alina hora después de sanjay el primer vaso de Golytely, Colyte o Nulytely. Continuarán periódicamente jose aproximadamente 1 a 2 horas después de que termine de sanjay el último vaso. En yonathan momento las heces deberían ser Lucie Latin y transparentes.  La sensación de hinchazón y/o náuseas son comunes después de los primeros vasos debido al gran volumen de líquido ingerido. Es alina sensación temporal, y mejorará cuando comiencen los movimientos intestinales.  Si tiene náuseas o vómitos, avise a ferraro médico.      Día de la endoscopía:   No tome ni coma nada después de la medianoche la noche antes de la endoscopía hasta 5 horas antes de la hora programada para ferraro endoscopía. Luego tome nuevamente la preparación que sobró, tome un vaso de 8 onzas cada 10 minutos hasta que haya tomado los cuatro vasos que [place]. Puede sanjay un vaso de agua de 8 onzas después de sanjay el último vaso de la preparación. No tome nada en las 3 horas antes de ferraro endoscopía.  Si está tomando algún medicamento para la presión sanguínea o medicamentos para el corazón en la mañana, debe tomarlos con un sorbo de agua antes de salir para el hospital para ferraro endoscopía.

## 2022-05-10 NOTE — PROGRESS NOTES
Bonita Clement called for patient states patient is currently with her. She would like to interpret for patient. Discussed would like to speak with patient via [address]   and would like to call back with the  and both patient and Bonita Clement could put the call on speaker phone and can review the information. Bonita Clement agreed discussed will call right back, different number.

## 2022-05-11 NOTE — PROGRESS NOTES
[address] PRE-ADMISSION TESTING   ENDOSCOPY/ COLONSCOPY INSTRUCTIONS  PAT- Phone Number: [phone]    ENDOSCOPY/ COLONSCOPY INSTRUCTIONS:     [x] Bowel Prep instructions reviewed. [x] Colonoscopy- The day prior: No solid foods. Clear liquids only. [x] Nothing by mouth after midnight. Including no gum, candy, mints, or water. [x] You may brush your teeth, gargle, but do NOT swallow water. [x] Do not wear makeup, lotions, powders, deodorant. [x] Arrange transportation with a responsible adult  to and from the hospital. If you do not have a responsible adult  to transport you, you will need to make arrangements with a medical transportation company. Arrange for someone to be with you for the remainder of the day and for 24 hours after your procedure due to having had anesthesia. -Who will be your  for transportation?__gladys________________   -Who will be staying with you for 24 hrs after your procedure?__gladys________________    PARKING INSTRUCTIONS:     [x] ARRIVAL TIME: _1100______   · [x] Enter into the [employer] [facility] [employer]. Two people may accompany you. Masks are required. · [x] Parking Lot \"I\" is where you will park. It is located on the corner of [address] and [address]. The entrance is on [address]. · To enter, press the button and the gate will lift. A free token will be provided to exit the lot. EDUCATION INSTRUCTIONS:    [x] Bring a complete list of your medications, please write the last time you took the medicine, give this list to the nurse. [x] Take the following medications the morning of surgery with 1-2 ounces of water: none  [x] Stop herbal supplements and vitamins 5 days before your surgery. [x] DO NOT take any diabetic medicine the morning of surgery. Follow instructions for insulin the day before surgery.   [x] If you are diabetic and your blood sugar is low or you feel symptomatic, you may drink 1-2 ounces of apple juice or take a glucose tablet. The morning of your procedure, you may call the pre-op area if you have concerns about your blood sugar [phone]. [] Use your inhalers the morning of surgery. Bring your emergency inhaler with you day of surgery. [x] Follow physician instructions regarding any blood thinners you may be taking. WHAT TO EXPECT:    [x] The day of your procedure you will be greeted and checked in by the [employer].  In addition, you will be registered in the [facility] by a Patient Access Representative. Please bring your photo ID and insurance card. A nurse will greet you in accordance to the time you are needed in the pre-op area to prepare you for surgery. Please do not be discouraged if you are not greeted in the order you arrive as there are many variables that are involved in patient preparation. Your patience is greatly appreciated as you wait for your nurse. Please bring in items such as: books, magazines, newspapers, electronics, or any other items  to occupy your time in the waiting area. [x]  Delays may occur. Staff will make a sincere effort to keep you informed of delays. If any delays occur with your procedure, we apologize ahead of time for your inconvenience as we recognize the value of your time.

## 2022-05-16 ENCOUNTER — ANESTHESIA (OUTPATIENT)
Dept: ENDOSCOPY | Age: 62
End: 2022-05-16
Payer: MEDICAID

## 2022-05-16 ENCOUNTER — ANESTHESIA EVENT (OUTPATIENT)
Dept: ENDOSCOPY | Age: 62
End: 2022-05-16
Payer: MEDICAID

## 2022-05-16 ENCOUNTER — HOSPITAL ENCOUNTER (OUTPATIENT)
Age: 62
Setting detail: OUTPATIENT SURGERY
Discharge: HOME | End: 2022-05-16
Attending: SURGERY | Admitting: SURGERY
Payer: MEDICAID

## 2022-05-16 VITALS
HEART RATE: 71 BPM | WEIGHT: 218 LBS | HEIGHT: 66 IN | SYSTOLIC BLOOD PRESSURE: 117 MMHG | TEMPERATURE: 97.5 F | OXYGEN SATURATION: 98 % | RESPIRATION RATE: 16 BRPM | DIASTOLIC BLOOD PRESSURE: 57 MMHG | BODY MASS INDEX: 35.03 KG/M2

## 2022-05-16 DIAGNOSIS — Z01.812 PRE-OPERATIVE LABORATORY EXAMINATION: Primary | ICD-10-CM

## 2022-05-16 LAB — METER GLUCOSE: 101 MG/DL (ref 74–99)

## 2022-05-16 PROCEDURE — 3609027000 HC COLONOSCOPY: Performed by: SURGERY

## 2022-05-16 PROCEDURE — 7100000010 HC PHASE II RECOVERY - FIRST 15 MIN: Performed by: SURGERY

## 2022-05-16 PROCEDURE — 6360000002 HC RX W HCPCS: Performed by: NURSE ANESTHETIST, CERTIFIED REGISTERED

## 2022-05-16 PROCEDURE — 43239 EGD BIOPSY SINGLE/MULTIPLE: CPT | Performed by: SURGERY

## 2022-05-16 PROCEDURE — 7100000011 HC PHASE II RECOVERY - ADDTL 15 MIN: Performed by: SURGERY

## 2022-05-16 PROCEDURE — 88305 TISSUE EXAM BY PATHOLOGIST: CPT

## 2022-05-16 PROCEDURE — 82962 GLUCOSE BLOOD TEST: CPT

## 2022-05-16 PROCEDURE — 88342 IMHCHEM/IMCYTCHM 1ST ANTB: CPT

## 2022-05-16 PROCEDURE — 2580000003 HC RX 258: Performed by: NURSE ANESTHETIST, CERTIFIED REGISTERED

## 2022-05-16 PROCEDURE — 3700000000 HC ANESTHESIA ATTENDED CARE: Performed by: SURGERY

## 2022-05-16 PROCEDURE — 3700000001 HC ADD 15 MINUTES (ANESTHESIA): Performed by: SURGERY

## 2022-05-16 PROCEDURE — 45378 DIAGNOSTIC COLONOSCOPY: CPT | Performed by: SURGERY

## 2022-05-16 PROCEDURE — 2709999900 HC NON-CHARGEABLE SUPPLY: Performed by: SURGERY

## 2022-05-16 PROCEDURE — 3609012400 HC EGD TRANSORAL BIOPSY SINGLE/MULTIPLE: Performed by: SURGERY

## 2022-05-16 RX ORDER — FENTANYL CITRATE 50 UG/ML
INJECTION, SOLUTION INTRAMUSCULAR; INTRAVENOUS PRN
Status: DISCONTINUED | OUTPATIENT
Start: 2022-05-16 | End: 2022-05-16 | Stop reason: SDUPTHER

## 2022-05-16 RX ORDER — PROPOFOL 10 MG/ML
INJECTION, EMULSION INTRAVENOUS PRN
Status: DISCONTINUED | OUTPATIENT
Start: 2022-05-16 | End: 2022-05-16 | Stop reason: SDUPTHER

## 2022-05-16 RX ORDER — SODIUM CHLORIDE 9 MG/ML
25 INJECTION, SOLUTION INTRAVENOUS PRN
Status: DISCONTINUED | OUTPATIENT
Start: 2022-05-16 | End: 2022-05-16 | Stop reason: HOSPADM

## 2022-05-16 RX ORDER — SODIUM CHLORIDE 0.9 % (FLUSH) 0.9 %
5-40 SYRINGE (ML) INJECTION PRN
Status: DISCONTINUED | OUTPATIENT
Start: 2022-05-16 | End: 2022-05-16 | Stop reason: HOSPADM

## 2022-05-16 RX ORDER — SODIUM CHLORIDE 9 MG/ML
INJECTION, SOLUTION INTRAVENOUS CONTINUOUS
Status: DISCONTINUED | OUTPATIENT
Start: 2022-05-16 | End: 2022-05-16 | Stop reason: HOSPADM

## 2022-05-16 RX ORDER — SODIUM CHLORIDE 9 MG/ML
INJECTION, SOLUTION INTRAVENOUS CONTINUOUS PRN
Status: DISCONTINUED | OUTPATIENT
Start: 2022-05-16 | End: 2022-05-16 | Stop reason: SDUPTHER

## 2022-05-16 RX ORDER — SODIUM CHLORIDE 0.9 % (FLUSH) 0.9 %
5-40 SYRINGE (ML) INJECTION EVERY 12 HOURS SCHEDULED
Status: DISCONTINUED | OUTPATIENT
Start: 2022-05-16 | End: 2022-05-16 | Stop reason: HOSPADM

## 2022-05-16 RX ADMIN — SODIUM CHLORIDE: 9 INJECTION, SOLUTION INTRAVENOUS at 12:51

## 2022-05-16 RX ADMIN — FENTANYL CITRATE 50 MCG: 50 INJECTION, SOLUTION INTRAMUSCULAR; INTRAVENOUS at 13:06

## 2022-05-16 RX ADMIN — FENTANYL CITRATE 50 MCG: 50 INJECTION, SOLUTION INTRAMUSCULAR; INTRAVENOUS at 12:55

## 2022-05-16 RX ADMIN — PROPOFOL 250 MG: 10 INJECTION, EMULSION INTRAVENOUS at 12:55

## 2022-05-16 ASSESSMENT — PAIN SCALES - GENERAL
PAINLEVEL_OUTOF10: 0

## 2022-05-16 ASSESSMENT — PAIN - FUNCTIONAL ASSESSMENT: PAIN_FUNCTIONAL_ASSESSMENT: 0-10

## 2022-05-16 ASSESSMENT — LIFESTYLE VARIABLES: SMOKING_STATUS: 0

## 2022-05-16 NOTE — ANESTHESIA PRE PROCEDURE
11/30/19  Inessa Hernandez APRN - CNP   ibuprofen (ADVIL;MOTRIN) 800 MG tablet Take 800 mg by mouth every 6 hours as needed for Pain  Patient not taking: Reported on 5/5/2022    Historical Provider, MD   furosemide (LASIX) 20 MG tablet Take 20 mg by mouth daily as needed   Patient not taking: Reported on 5/5/2022    Historical Provider, MD   metFORMIN (GLUCOPHAGE) 500 MG tablet Take 500 mg by mouth 2 times daily (with meals)  Patient not taking: Reported on 5/5/2022    Historical Provider, MD   levothyroxine (SYNTHROID) 100 MCG tablet Take 1 tablet by mouth daily. Take with water on an empty stomach- wait 30 minutes before eating or taking other meds. 4/11/14   500 Tona Jensen MD   enalapril (VASOTEC) 10 MG tablet Take 1 tablet by mouth daily. Patient not taking: Reported on 5/5/2022 4/11/14   500 Tona Jensen MD       Current medications:    No current facility-administered medications for this visit. No current outpatient medications on file.      Facility-Administered Medications Ordered in Other Visits   Medication Dose Route Frequency Provider Last Rate Last Admin    0.9 % sodium chloride infusion   IntraVENous Continuous Cristopher Leavitt MD        sodium chloride flush 0.9 % injection 5-40 mL  5-40 mL IntraVENous 2 times per day Cristopher Leavitt MD        sodium chloride flush 0.9 % injection 5-40 mL  5-40 mL IntraVENous PRN Cristopher Leavitt MD        0.9 % sodium chloride infusion  25 mL IntraVENous PRN Cristopher Leavitt MD           Allergies:  No Known Allergies    Problem List:    Patient Active Problem List   Diagnosis Code    Hyperlipidemia with target LDL less than 100 E78.5    Type 2 diabetes mellitus (HCC) E11.9    Hypothyroidism E03.9    Colon polyps K63.5    Incontinence of urine R32    Glucose intolerance (impaired glucose tolerance) R73.02    HTN, goal below 140/90 I10    Rib pain on left side R07.81    Spinal stenosis M48.00    Multiple adenomatous polyps D36.9    Hiatal hernia K44.9    Gastroesophageal reflux disease without esophagitis K21.9    Acute superficial gastritis without hemorrhage K29.00    History of lumbar laminectomy Z98.890    Postlaminectomy syndrome, lumbar region M96.1    Chronic low back pain M54.50, G89.29       Past Medical History:        Diagnosis Date    Anemia     Chronic low back pain 2018    Chronic UTI     Colon polyps 4464-2930    Tubular adenoma in     Gastroesophageal reflux disease without esophagitis 2018    History of lumbar laminectomy 2018    L4/5 level    Hyperlipidemia     Hypertension     Hypothyroidism     PONV (postoperative nausea and vomiting)     after epidural- nausea, vomiting and headache    Postlaminectomy syndrome, lumbar region 2018    Type II or unspecified type diabetes mellitus without mention of complication, not stated as uncontrolled     NO MED    Urinary incontinence        Past Surgical History:        Procedure Laterality Date    ANKLE SURGERY      fracture  pins used    ANKLE SURGERY      times 3 rt ankle    BREAST SURGERY      reduction and reconstruction both breasts     SECTION  1995    CHOLECYSTECTOMY      COLONOSCOPY  2008    Diverticulosis, one polyp - tubular adenoma. Gets every 2 years     COLONOSCOPY  2012    Polyps.  Needs reepat in     COLONOSCOPY  2014    LUMBAR LAMINECTOMY  2016    L4-5    OK COLONOSCOPY FLX DX W/COLLJ SPEC WHEN PFRMD N/A 2018    COLONOSCOPY LOW  SCREENING performed by Julissa Mina MD at [address] [place] [address] EGD TRANSORAL BIOPSY SINGLE/MULTIPLE N/A 2018    EGD BIOPSY performed by Julissa Mina MD at [address] [place] [address]  2019    done in [address] [place] [address] Rd ENDOSCOPY  2008    Hiatal hernia    UPPER GASTROINTESTINAL ENDOSCOPY  2012    Hiatal hernia    UPPER GASTROINTESTINAL ENDOSCOPY 04/16/2014    UPPER GASTROINTESTINAL ENDOSCOPY  04/06/2015    DR Lisa Scale       Social History:    Social History     Tobacco Use    Smoking status: Never Smoker    Smokeless tobacco: Never Used   Substance Use Topics    Alcohol use: No     Alcohol/week: 0.0 standard drinks                                Counseling given: Not Answered      Vital Signs (Current): There were no vitals filed for this visit. BP Readings from Last 3 Encounters:   05/16/22 119/71   05/05/22 115/73   02/06/19 130/80       NPO Status:                                                                                 BMI:   Wt Readings from Last 3 Encounters:   05/05/22 218 lb (98.9 kg)   02/06/19 275 lb (124.7 kg)   07/26/18 267 lb (121.1 kg)     There is no height or weight on file to calculate BMI.    CBC:   Lab Results   Component Value Date    WBC 5.8 03/30/2016    RBC 4.43 03/30/2016    HGB 11.7 03/30/2016    HCT 35.9 03/30/2016    MCV 81.0 03/30/2016    RDW 13.9 03/30/2016     03/30/2016       CMP:   Lab Results   Component Value Date     03/30/2016    K 3.7 03/30/2016     03/30/2016    CO2 27 03/30/2016    BUN 7 03/30/2016    CREATININE 0.5 03/30/2016    GFRAA >60 03/30/2016    LABGLOM >60 03/30/2016    GLUCOSE 95 03/30/2016    PROT 7.1 03/30/2016    CALCIUM 9.3 03/30/2016    BILITOT 0.3 03/30/2016    ALKPHOS 72 03/30/2016    AST 25 03/30/2016    ALT 28 03/30/2016       POC Tests: No results for input(s): POCGLU, POCNA, POCK, POCCL, POCBUN, POCHEMO, POCHCT in the last 72 hours.     Coags:   Lab Results   Component Value Date    PROTIME 11.5 03/16/2016    INR 1.0 03/16/2016    APTT 29.5 03/16/2016       HCG (If Applicable): No results found for: PREGTESTUR, PREGSERUM, HCG, HCGQUANT     ABGs: No results found for: PHART, PO2ART, UDY9VDJ, TFU4UIV, BEART, E3LNXPFF     Type & Screen (If Applicable):  No results found for: LABABO, [facility]    Anesthesia Evaluation  Patient summary reviewed and Nursing notes reviewed   history of anesthetic complications: PONV. Airway: Mallampati: II  TM distance: >3 FB   Neck ROM: full  Mouth opening: > = 3 FB Dental: normal exam         Pulmonary: breath sounds clear to auscultation  (+) sleep apnea: on CPAP,      (-) not a current smoker                           Cardiovascular:    (+) hypertension:, hyperlipidemia        Rhythm: regular  Rate: normal                    Neuro/Psych:   (+) neuromuscular disease (states hx of neuropathy):,              ROS comment: Spinal stenosis. GI/Hepatic/Renal:   (+) hiatal hernia, GERD:,          ROS comment: Pre-op diagnosis: GERD, DIARRHEA. Endo/Other:    (+) DiabetesType II DM, well controlled, , hypothyroidism::., .                 Abdominal:   (+) obese,     Abdomen: soft. Vascular: negative vascular ROS. Other Findings:        NPO since 2300, 07/25/2018  (Translation via hospital )       Anesthesia Plan      MAC     ASA 3     (22g right FA)  Induction: intravenous. Anesthetic plan and risks discussed with patient. Plan discussed with CRNA and attending. Attending anesthesiologist reviewed and agrees with Dana Madrid MD   5/16/2022    Pt seen, examined, chart reviewed, plan discussed.   Shadi Bower MD  5/16/2022  11:20 AM

## 2022-05-16 NOTE — OP NOTE
Operative Note      Patient: Belem Stringer  YOB: 1960  MRN: [id number]    Date of Procedure: 5/16/2022    Pre-Op Diagnosis: IRON DEFICIENCY ANEMIA, CONSTIPATION    Post-Op Diagnosis: moderate gastritis, normal colon       Procedure(s):  EGD BIOPSY  COLONOSCOPY DIAGNOSTIC    Surgeon(s):  Vedia Ormond, MD    Assistant:   Resident: Braydon Mabry MD    Anesthesia: Monitor Anesthesia Care    Estimated Blood Loss (mL): Minimal    Complications: None    Specimens:   ID Type Source Tests Collected by Time Destination   A : gastric antral biopsy r/o H pylori Gastric Gastric SURGICAL PATHOLOGY Vedia Ormond, MD 5/16/2022 1300        Implants:  * No implants in log *      Drains: * No LDAs found *    Findings: moderate gastritis, normal colon    HISTORY: The patient is a 59y.o. year old female with history of above preop diagnosis. I recommended esophagogastroduodenoscopy with possible biopsy and I explained the risk, benefits, expected outcome, and alternatives to the procedure. Risks included but are not limited to bleeding, infection, respiratory distress, hypotension, and perforation of the esophagus, stomach, or duodenum. Patient understands and is in agreement. PROCEDURE: The patient was connected to the monitors and given supplemental oxygen by nasal cannula. The patient was sedated. The gastroscope was inserted orally and advanced under direct vision through the esophagus, through the gastric pouch and into the jejunum . 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 IMPRESSION/PLAN:   1. Continue PPI  2. Follow up as needed/for Bx results    Dr. Cecilio Escalante was present for the procedure.      Electronically signed by Braydon Mabry MD on 5/16/22 at 1:25 PM EDT Chavo Currie MD

## 2022-05-16 NOTE — ANESTHESIA POSTPROCEDURE EVALUATION
Department of Anesthesiology  Postprocedure Note    Patient: Colton Healy  MRN: [id number]  YOB: 1960  Date of evaluation: 5/16/2022  Time:  1:55 PM     Procedure Summary     Date: 05/16/22 Room / Location: [address] [place] [address] [place] [facility]    Anesthesia Start: 5126 Anesthesia Stop: 2712    Procedures:       EGD BIOPSY (N/A )      COLONOSCOPY DIAGNOSTIC (N/A ) Diagnosis: (IRON DEFICIENCY ANEMIA, CONSTIPATION)    Surgeons: Nathanael Judd MD Responsible Provider: Lenard Cueva MD    Anesthesia Type: MAC ASA Status: 3          Anesthesia Type: No value filed. Micheline Phase I: Micheline Score: 10    Micheline Phase II:      Last vitals: Reviewed and per EMR flowsheets.        Anesthesia Post Evaluation    Patient location during evaluation: PACU  Patient participation: complete - patient participated  Level of consciousness: awake  Pain score: 0  Airway patency: patent  Nausea & Vomiting: no nausea  Complications: no  Cardiovascular status: hemodynamically stable  Respiratory status: acceptable  Hydration status: stable

## 2022-05-16 NOTE — H&P
[facility]    General Surgery Attending History and Physical    Patient's Name/Date of Birth: Jemma Peña / 1960 (75 y.o.)    Date: May 16, 2022     CC:iron deficiency anemia    HPI:  60 yo who I last saw in 2018. Patient underwent [place] Pac in 3/19/2019. She has lost 60 pounds. She now has anemia. She now receives IV iron at the cancer center. She has had 4 treatments in the past year. She has a follow up on May 23. Pt was taking omeprazole but she vomits it up    The patient reported  acute, constant epigastric pain localized to the area that started 1 year ago. The intensity of the pain is moderate. There are no alleviating or worsening factors regarding the pain. Pt complains of constipation  And bloating. This all worsened after the surgery    Past Medical History:   Diagnosis Date    Anemia     Chronic low back pain 2018    Chronic UTI     Colon polyps [id number]    Tubular adenoma in     Gastroesophageal reflux disease without esophagitis 2018    History of lumbar laminectomy 2018    L4/5 level    Hyperlipidemia     Hypertension     Hypothyroidism     PONV (postoperative nausea and vomiting)     after epidural- nausea, vomiting and headache    Postlaminectomy syndrome, lumbar region 2018    Type II or unspecified type diabetes mellitus without mention of complication, not stated as uncontrolled     NO MED    Urinary incontinence        Past Surgical History:   Procedure Laterality Date    ANKLE SURGERY      fracture  pins used    ANKLE SURGERY      times 3 rt ankle    BREAST SURGERY      reduction and reconstruction both breasts     SECTION  1995    CHOLECYSTECTOMY      COLONOSCOPY  2008    Diverticulosis, one polyp - tubular adenoma. Gets every 2 years     COLONOSCOPY  2012    Polyps.  Needs reepat in     COLONOSCOPY  2014    LUMBAR LAMINECTOMY  2016    L4-5    [place] COLONOSCOPY FLX DX W/COLLJ SPEC WHEN PFRMD N/A 07/26/2018    COLONOSCOPY LOW  SCREENING performed by Sandra Oconnell MD at [facility] EGD TRANSORAL BIOPSY SINGLE/MULTIPLE N/A 07/26/2018    EGD BIOPSY performed by Sandra Oconnell MD at [facility]  03/19/2019    done in [facility] ENDOSCOPY  01/01/2008    Hiatal hernia    UPPER GASTROINTESTINAL ENDOSCOPY  01/01/2012    Hiatal hernia    UPPER GASTROINTESTINAL ENDOSCOPY  04/16/2014    UPPER GASTROINTESTINAL ENDOSCOPY  04/06/2015    DR Tigist Kim       Current Facility-Administered Medications   Medication Dose Route Frequency Provider Last Rate Last Admin    0.9 % sodium chloride infusion   IntraVENous Continuous Sandra Oconnell MD        sodium chloride flush 0.9 % injection 5-40 mL  5-40 mL IntraVENous 2 times per day Sandra Oconnell MD        sodium chloride flush 0.9 % injection 5-40 mL  5-40 mL IntraVENous PRN Sandra Oconnell MD        0.9 % sodium chloride infusion  25 mL IntraVENous PRN Sandra Oconnell MD           No Known Allergies    Family History   Problem Relation Age of Onset    Diabetes Mother     High Blood Pressure Mother     Thyroid Disease Mother     Diabetes Father     Heart Disease Father     Thyroid Disease Daughter     Seizures Daughter        Social History     Socioeconomic History    Marital status:      Spouse name: Not on file    Number of children: Not on file    Years of education: Not on file    Highest education level: Not on file   Occupational History    Not on file   Tobacco Use    Smoking status: Never Smoker    Smokeless tobacco: Never Used   Vaping Use    Vaping Use: Never used   Substance and Sexual Activity    Alcohol use: No     Alcohol/week: 0.0 standard drinks    Drug use: No    Sexual activity: Yes   Other Topics Concern    Not on file   Social History Narrative    Not on file     Social Determinants of Health     Financial Resource Strain:     Difficulty of Paying Living Expenses: Not on file   Food Insecurity:     Worried About Running Out of Food in the Last Year: Not on file    Shante of Food in the Last Year: Not on file   Transportation Needs:     Lack of Transportation (Medical): Not on file    Lack of Transportation (Non-Medical):  Not on file   Physical Activity:     Days of Exercise per Week: Not on file    Minutes of Exercise per Session: Not on file   Stress:     Feeling of Stress : Not on file   Social Connections:     Frequency of Communication with Friends and Family: Not on file    Frequency of Social Gatherings with Friends and Family: Not on file    Attends Restorationism Services: Not on file    Active Member of [address] [place] [address] [employer] or Organizations: Not on file    Attends Club or Organization Meetings: Not on file    Marital Status: Not on file   Intimate Partner Violence:     Fear of Current or Ex-Partner: Not on file    Emotionally Abused: Not on file    Physically Abused: Not on file    Sexually Abused: Not on file   Housing Stability:     Unable to Pay for Housing in the Last Year: Not on file    Number of [place] in the Last Year: Not on file    Unstable Housing in the Last Year: Not on file       ROS:  Review of Systems - History obtained from the patient  General ROS: negative  Psychological ROS: negative  Ophthalmic ROS: negative  Allergy and Immunology ROS: negative  Hematological and Lymphatic ROS: pos for anemia  Endocrine ROS: negative  Breast ROS: negative  Respiratory ROS: negative  Cardiovascular ROS: negative  Gastrointestinal ROS: positive for - abdominal pain and gas/bloating  Genito-Urinary ROS: negative  Musculoskeletal ROS: negative        Physical Exam:  Vitals:    05/16/22 1117   BP: 119/71   Pulse: 71   Resp: 18   Temp: 98 °F (36.7 °C)   SpO2: 94%       PSYCH: mood and affect normal, alert and oriented x 3  CONSTITUTIONAL: No apparent distress, comfortable  EYES: Sclera white, pupils equal round and reactive to light  ENMT:  Hearing normal, trachea midline, ears externally intact  LYMPH: no lympadenopathy in neck. No lympadenopathy in groins  RESP: Breath sounds were clear and equal with no rales, wheezes, or rhonchi. Respiratory effort was normal with no retractions or use of accessory muscles. CV: Heart sounds were normal with a regular rate and rhythm. No pedal edema  GI/ Abdomen: The abdomen was soft and non distended. There was no tenderness, guarding, rebound, or rigidity. There was no                     masses, hepatosplenomegaly, or hernias. No inguinal hernias were noted on coughing and straining. Rectal -deferred  MSK: no clubbing/ no cyanosis/ gait normal       Assessment/Plan:    Iron deficiency anemia  Constipation/ bloating      I have reviewed the prior progress note from the patient's primary care provider visit      Pt does not tolerate omeprazole secondary to nausea  tums does not help. Will start carafate 10 cc qid   Plan on EGD to rule out marginal ulcer after gastric bypass  I have discussed the risks, benefits, and alternatives to esophagogastroduodenoscopy with possible biopsy with deep sedation with the patient. I have detailed the risks of deep sedation (hypotension, hypoxia) as well as complications of bleeding and perforation. The patient understands the above and agrees to proceed. -start miralax powder  Plan on colonoscopy  2d clear  I discussed the risks, benefits, and alternatives to colonoscopy with possible biopsy/cauterization/polylpectomy with deep sedation with the patient including the risks of deep sedation (hypotension, hypoxia), bleeding, and perforation (<1%). The patient understands the above and agrees to proceed. Katie Logan MD, FACS  5/16/2022  12:47 PM     NOTE: This report was transcribed using voice recognition software.  Every effort was made to ensure accuracy; however, inadvertent computerized transcription errors may be present.

## 2022-05-16 NOTE — OP NOTE
Operative Note      Patient: Belem Stringer  YOB: 1960  MRN: [id number]    Date of Procedure: 5/16/2022    Pre-Op Diagnosis: IRON DEFICIENCY ANEMIA, CONSTIPATION    Post-Op Diagnosis:  Moderate gastritis, normal colonoscopy       Procedure(s):  EGD BIOPSY  COLONOSCOPY DIAGNOSTIC    Surgeon(s):  Vedia Ormond, MD    Assistant:   Resident: Braydon Mabry MD    Anesthesia: Monitor Anesthesia Care    Estimated Blood Loss (mL): Minimal    Complications: None    Specimens:   ID Type Source Tests Collected by Time Destination   A : gastric antral biopsy r/o H pylori Gastric Gastric SURGICAL PATHOLOGY Vedia Ormond, MD 5/16/2022 1300        Implants:  * No implants in log *      Drains: * No LDAs found *    Findings: normal colonoscopy     HISTORY: The patient is a 59y.o. year old female with history of above preop diagnosis. I recommended colonoscopy with possible biopsy or polypectomy and I explained the risk, benefits, expected outcome, and alternatives to the procedure. Risks included but are not limited to bleeding, infection, respiratory distress, hypotension, and perforation of the colon. The patient understands and is in agreement. PROCEDURE: The patient was given IV conscious sedation per anesthesia. The patient was given supplemental oxygen by nasal cannula. I performed a digital rectal exam and no masses were palpated. The colonoscope was inserted per rectum and advanced under direct vision to the cecum without difficulty. The prep was excellent so exam was adequate. FINDINGS:  Cecum/Ascending colon: appendiceal orifice noted, iliocecal valve visualized, normal    Transverse colon: normal    Descending/Sigmoid colon: normal, tortuous sigmoid    Rectum/Anus: examined in normal and retroflexed positions and was normal    The colon was decompressed and the scope was removed. The withdraw time was approximately 6 minutes. The patient tolerated the procedure well.      ASSESSMENT/PLAN: 1. Constipation is likely secondary to the redundant colon--Continue miralax BID  2. Acute anemia is secondary to malnutrition from gastric bypass  3. Diet as tolerated  4. Colorectal Cancer Screening - recommend repeat colonoscopy in 10 years  Dr. Juan Ramon Ford was present for the procedure.      Electronically signed by Lucía Beauchamp MD on 5/16/2022 at 1:27 PM     Kiera Webber MD

## 2022-06-14 ENCOUNTER — OFFICE VISIT (OUTPATIENT)
Dept: OBGYN | Age: 62
End: 2022-06-14
Payer: MEDICAID

## 2022-06-14 VITALS
BODY MASS INDEX: 35.99 KG/M2 | WEIGHT: 216 LBS | DIASTOLIC BLOOD PRESSURE: 63 MMHG | SYSTOLIC BLOOD PRESSURE: 139 MMHG | HEIGHT: 65 IN | HEART RATE: 88 BPM

## 2022-06-14 DIAGNOSIS — B96.89 BV (BACTERIAL VAGINOSIS): ICD-10-CM

## 2022-06-14 DIAGNOSIS — Z01.419 WELL WOMAN EXAM WITH ROUTINE GYNECOLOGICAL EXAM: Primary | ICD-10-CM

## 2022-06-14 DIAGNOSIS — Z12.4 PAP SMEAR FOR CERVICAL CANCER SCREENING: ICD-10-CM

## 2022-06-14 DIAGNOSIS — N76.0 BV (BACTERIAL VAGINOSIS): ICD-10-CM

## 2022-06-14 PROCEDURE — 99386 PREV VISIT NEW AGE 40-64: CPT | Performed by: OBSTETRICS & GYNECOLOGY

## 2022-06-14 PROCEDURE — 99203 OFFICE O/P NEW LOW 30 MIN: CPT | Performed by: OBSTETRICS & GYNECOLOGY

## 2022-06-14 RX ORDER — METRONIDAZOLE 500 MG/1
500 TABLET ORAL 2 TIMES DAILY
Qty: 14 TABLET | Refills: 0 | Status: SHIPPED | OUTPATIENT
Start: 2022-06-14 | End: 2022-06-21

## 2022-06-14 NOTE — PROGRESS NOTES
Chief complaint: 59 year- old presents for well woman exam. I have not seen her since 2013    History:    G2, P2,Ab0. Doing well. Periods:  Menopausal.  Sexually active: yes . No abdominal or pelvic pain. No dyspareunia. No abnormal vaginal discharge. Gets some itching off and on. Previous Pap smears normal. Last Pap smear 2017, Negative with a negative HPV. .  No urinary or GI symptoms. Medical/ surgical history and medications reviewed. Mammogram done recently at [facility] thru her PCP. Maritza Dang here to translate today. ROS: Negative    Examination:    Vital signs reviewed. GA: Healthy. Oriented x 3 no distress. HEENT: hearing grossly intact, no jaundice, no oral lesions or nasal discharge. Neck: Supple, no masses or lymphadenopathy. Thyroid: normal, no goiter. Abdomen: Soft. No masses. No organomegaly. V&V: Normal female external genitalia. Some either BV or Trich present, will treat with Flagyl  BUS: No masses or cysts. PS: Adequate. Cervix: No lesions or mucopurulent discharge, pap smear done as a TPP with Co-testing. Sunny Douglass Uterus: Normal size. Adnexa: Free, no masses or tenderness. IMP: Normal well woman, BV    PLAN: Rx Flagyl 500 bid for 7 days with food.   RTC 3 years if pap is normal.  Mammogram yearly

## 2022-06-14 NOTE — PROGRESS NOTES
New patient alert and pleasant with no complaints  Here today for annual GYN exam  Assisted with Pelvic exam, pap smear obtained, labeled  and delivered to lab. Discharge instructions have been discussed with the patient. Patient advised to call our office with any questions or concerns. Voiced understanding.   Shell wiggins here to assist with this visit

## 2022-06-17 LAB
HPV SAMPLE: NORMAL
HPV TYPE 16: NOT DETECTED
HPV TYPE 18: NOT DETECTED
HPV, HIGH RISK OTHER: NOT DETECTED
INTERPRETATION: NORMAL
SOURCE: NORMAL

## 2022-07-07 ENCOUNTER — TELEPHONE (OUTPATIENT)
Dept: OBGYN | Age: 62
End: 2022-07-07

## 2022-07-07 NOTE — TELEPHONE ENCOUNTER
Patient was here on 6/14/22 for annual   Patient given Flagyl for BV   Patient called and states she continues to have symptoms and is requesting something to help with these symptoms   Please advise

## 2022-07-07 NOTE — TELEPHONE ENCOUNTER
I will need to see her again for recheck and probably a wet prep. Pap did not show any other infection. Work  her in early next week.

## 2022-08-09 ENCOUNTER — OFFICE VISIT (OUTPATIENT)
Dept: OBGYN | Age: 62
End: 2022-08-09
Payer: MEDICAID

## 2022-08-09 VITALS
WEIGHT: 217 LBS | DIASTOLIC BLOOD PRESSURE: 84 MMHG | BODY MASS INDEX: 36.11 KG/M2 | SYSTOLIC BLOOD PRESSURE: 128 MMHG | HEART RATE: 73 BPM

## 2022-08-09 DIAGNOSIS — B96.89 BV (BACTERIAL VAGINOSIS): ICD-10-CM

## 2022-08-09 DIAGNOSIS — N89.8 VAGINAL ITCHING: Primary | ICD-10-CM

## 2022-08-09 DIAGNOSIS — Z78.9 NON-ENGLISH SPEAKING PATIENT: ICD-10-CM

## 2022-08-09 DIAGNOSIS — N76.0 BV (BACTERIAL VAGINOSIS): ICD-10-CM

## 2022-08-09 DIAGNOSIS — N94.9 VAGINAL LUMP: ICD-10-CM

## 2022-08-09 PROCEDURE — 99213 OFFICE O/P EST LOW 20 MIN: CPT | Performed by: OBSTETRICS & GYNECOLOGY

## 2022-08-09 PROCEDURE — 1036F TOBACCO NON-USER: CPT | Performed by: OBSTETRICS & GYNECOLOGY

## 2022-08-09 PROCEDURE — G8427 DOCREV CUR MEDS BY ELIG CLIN: HCPCS | Performed by: OBSTETRICS & GYNECOLOGY

## 2022-08-09 PROCEDURE — 3017F COLORECTAL CA SCREEN DOC REV: CPT | Performed by: OBSTETRICS & GYNECOLOGY

## 2022-08-09 PROCEDURE — G8417 CALC BMI ABV UP PARAM F/U: HCPCS | Performed by: OBSTETRICS & GYNECOLOGY

## 2022-08-09 RX ORDER — METRONIDAZOLE 500 MG/1
500 TABLET ORAL 2 TIMES DAILY
Qty: 14 TABLET | Refills: 0 | Status: SHIPPED | OUTPATIENT
Start: 2022-08-09 | End: 2022-08-16

## 2022-08-09 NOTE — PROGRESS NOTES
Here today with complaint of \"itching\" and feels like a lump near the vaginal area. Alva Skiff here to translate for today's visit. Uses a hypoallergic soap. Perhaps a little discharge. Not much odor. Also notes a possible lump inside the vagina. Notices when she get dressed. Pelvic: Vulva is normal   Vaghinally has BV presnt. No evidence of monilia    What she perceives as a \"lump\" is the base of the urethra and this is a normal finding. IMP: Vaginal Itching, BV    PLAN:Flagyl 500 bid for 7 days with food.

## 2022-09-16 RX ORDER — SUCRALFATE ORAL 1 G/10ML
SUSPENSION ORAL
Qty: 1200 ML | Refills: 3 | Status: SHIPPED | OUTPATIENT
Start: 2022-09-16

## 2023-07-24 ENCOUNTER — OFFICE VISIT (OUTPATIENT)
Dept: OBGYN | Age: 63
End: 2023-07-24
Payer: MEDICAID

## 2023-07-24 VITALS
BODY MASS INDEX: 37.15 KG/M2 | DIASTOLIC BLOOD PRESSURE: 64 MMHG | HEIGHT: 65 IN | HEART RATE: 83 BPM | SYSTOLIC BLOOD PRESSURE: 145 MMHG | WEIGHT: 223 LBS

## 2023-07-24 DIAGNOSIS — N89.8 VAGINAL DISCHARGE: Primary | ICD-10-CM

## 2023-07-24 DIAGNOSIS — R30.0 DYSURIA: ICD-10-CM

## 2023-07-24 DIAGNOSIS — R10.2 PELVIC PAIN: ICD-10-CM

## 2023-07-24 DIAGNOSIS — N89.8 VAGINAL ITCHING: ICD-10-CM

## 2023-07-24 LAB
BILIRUBIN URINE: NEGATIVE
COLOR: YELLOW
GLUCOSE URINE: NEGATIVE MG/DL
KETONES, URINE: NEGATIVE MG/DL
LEUKOCYTE ESTERASE, URINE: ABNORMAL
NITRITE, URINE: NEGATIVE
PH UA: 6 (ref 5–9)
PROTEIN UA: NEGATIVE MG/DL
SPECIFIC GRAVITY UA: 1.02 (ref 1–1.03)
TURBIDITY: CLEAR
URINE HGB: NEGATIVE
UROBILINOGEN, URINE: 0.2 EU/DL (ref 0–1)

## 2023-07-24 PROCEDURE — G8428 CUR MEDS NOT DOCUMENT: HCPCS | Performed by: STUDENT IN AN ORGANIZED HEALTH CARE EDUCATION/TRAINING PROGRAM

## 2023-07-24 PROCEDURE — 3077F SYST BP >= 140 MM HG: CPT | Performed by: STUDENT IN AN ORGANIZED HEALTH CARE EDUCATION/TRAINING PROGRAM

## 2023-07-24 PROCEDURE — 99213 OFFICE O/P EST LOW 20 MIN: CPT | Performed by: STUDENT IN AN ORGANIZED HEALTH CARE EDUCATION/TRAINING PROGRAM

## 2023-07-24 PROCEDURE — G8417 CALC BMI ABV UP PARAM F/U: HCPCS | Performed by: STUDENT IN AN ORGANIZED HEALTH CARE EDUCATION/TRAINING PROGRAM

## 2023-07-24 PROCEDURE — 3078F DIAST BP <80 MM HG: CPT | Performed by: STUDENT IN AN ORGANIZED HEALTH CARE EDUCATION/TRAINING PROGRAM

## 2023-07-24 PROCEDURE — 1036F TOBACCO NON-USER: CPT | Performed by: STUDENT IN AN ORGANIZED HEALTH CARE EDUCATION/TRAINING PROGRAM

## 2023-07-24 PROCEDURE — 3017F COLORECTAL CA SCREEN DOC REV: CPT | Performed by: STUDENT IN AN ORGANIZED HEALTH CARE EDUCATION/TRAINING PROGRAM

## 2023-07-24 NOTE — PROGRESS NOTES
Patient alert and pleasant with complaints of vaginal itching   Pelvic exam, Health track specimen  obtained, labeled  and delivered to lab vis [employer] delivery  Urine for UA obtained, labeled and sent to lab   Discharge instructions have been discussed with the patient. Patient advised to call our office with any questions or concerns. Voiced understanding.     [id number] assisted with this visit

## 2023-07-24 NOTE — PROGRESS NOTES
Clem Dodd presents for an almost 2-month history of vaginal itching and discharge and pain. She was diagnosed with a UTI at the beginning of June. She was treated with Cipro. Then, she began to get pelvic pain, vaginal inflammation, vaginal itching and discharge. She treated it with an over-the-counter medication. It has not helped. She is still suffering. She continues to have the vaginal itching, discharge, pelvic pain and she does have pain with urination. She denies any recent change in sexual partner, postmenopausal bleeding, fever, chills or any other new medical issues. Physical exam:  General: No apparent distress, alert and oriented x3  Psych: Normal mood and affect  Abdomen: Obese, soft, nondistended, no guarding or rebound  Pelvic: +thin white discharge. Cervix without lesion. No bleeding. No adnexal masses.   Normal atrophy      Assessment:    Vaginal itching, discharge and pelvic pain  Dysuria    Plan:  Vaginal culture  UA    Will treat according to test results

## 2023-07-27 LAB
BACTERIA: ABNORMAL
EPITHELIAL CELLS UA: ABNORMAL /HPF
MUCUS: PRESENT
RBC UA: ABNORMAL /HPF
WBC UA: ABNORMAL /HPF

## 2024-08-20 ENCOUNTER — OFFICE VISIT (OUTPATIENT)
Age: 64
End: 2024-08-20
Payer: MEDICARE

## 2024-08-20 VITALS
DIASTOLIC BLOOD PRESSURE: 60 MMHG | OXYGEN SATURATION: 96 % | RESPIRATION RATE: 16 BRPM | SYSTOLIC BLOOD PRESSURE: 127 MMHG | BODY MASS INDEX: 39.12 KG/M2 | TEMPERATURE: 97.5 F | WEIGHT: 234.8 LBS | HEART RATE: 82 BPM | HEIGHT: 65 IN

## 2024-08-20 DIAGNOSIS — E03.9 ACQUIRED HYPOTHYROIDISM: ICD-10-CM

## 2024-08-20 DIAGNOSIS — D50.8 OTHER IRON DEFICIENCY ANEMIA: ICD-10-CM

## 2024-08-20 DIAGNOSIS — Z91.81 AT HIGH RISK FOR FALLS: ICD-10-CM

## 2024-08-20 DIAGNOSIS — Z11.59 NEED FOR HEPATITIS C SCREENING TEST: ICD-10-CM

## 2024-08-20 DIAGNOSIS — Z12.31 BREAST CANCER SCREENING BY MAMMOGRAM: ICD-10-CM

## 2024-08-20 DIAGNOSIS — Z86.79 HISTORY OF HYPERTENSION: ICD-10-CM

## 2024-08-20 DIAGNOSIS — E11.9 TYPE 2 DIABETES MELLITUS WITHOUT COMPLICATION, WITH LONG-TERM CURRENT USE OF INSULIN (HCC): Primary | ICD-10-CM

## 2024-08-20 DIAGNOSIS — Z11.4 SCREENING FOR HIV WITHOUT PRESENCE OF RISK FACTORS: ICD-10-CM

## 2024-08-20 DIAGNOSIS — Z79.4 TYPE 2 DIABETES MELLITUS WITHOUT COMPLICATION, WITH LONG-TERM CURRENT USE OF INSULIN (HCC): Primary | ICD-10-CM

## 2024-08-20 DIAGNOSIS — Z98.890 HISTORY OF LUMBAR LAMINECTOMY: Chronic | ICD-10-CM

## 2024-08-20 DIAGNOSIS — E78.5 HYPERLIPIDEMIA WITH TARGET LDL LESS THAN 100: ICD-10-CM

## 2024-08-20 DIAGNOSIS — K64.4 SKIN TAG OF PERIANAL REGION: ICD-10-CM

## 2024-08-20 DIAGNOSIS — E55.9 VITAMIN D DEFICIENCY: ICD-10-CM

## 2024-08-20 LAB — HBA1C MFR BLD: 6 %

## 2024-08-20 PROCEDURE — 3046F HEMOGLOBIN A1C LEVEL >9.0%: CPT | Performed by: FAMILY MEDICINE

## 2024-08-20 PROCEDURE — G8417 CALC BMI ABV UP PARAM F/U: HCPCS | Performed by: FAMILY MEDICINE

## 2024-08-20 PROCEDURE — 1036F TOBACCO NON-USER: CPT | Performed by: FAMILY MEDICINE

## 2024-08-20 PROCEDURE — G2211 COMPLEX E/M VISIT ADD ON: HCPCS | Performed by: FAMILY MEDICINE

## 2024-08-20 PROCEDURE — 1090F PRES/ABSN URINE INCON ASSESS: CPT | Performed by: FAMILY MEDICINE

## 2024-08-20 PROCEDURE — 3078F DIAST BP <80 MM HG: CPT | Performed by: FAMILY MEDICINE

## 2024-08-20 PROCEDURE — 99204 OFFICE O/P NEW MOD 45 MIN: CPT | Performed by: FAMILY MEDICINE

## 2024-08-20 PROCEDURE — 1123F ACP DISCUSS/DSCN MKR DOCD: CPT | Performed by: FAMILY MEDICINE

## 2024-08-20 PROCEDURE — 83036 HEMOGLOBIN GLYCOSYLATED A1C: CPT | Performed by: FAMILY MEDICINE

## 2024-08-20 PROCEDURE — 2022F DILAT RTA XM EVC RTNOPTHY: CPT | Performed by: FAMILY MEDICINE

## 2024-08-20 PROCEDURE — G8427 DOCREV CUR MEDS BY ELIG CLIN: HCPCS | Performed by: FAMILY MEDICINE

## 2024-08-20 PROCEDURE — G8400 PT W/DXA NO RESULTS DOC: HCPCS | Performed by: FAMILY MEDICINE

## 2024-08-20 PROCEDURE — 3017F COLORECTAL CA SCREEN DOC REV: CPT | Performed by: FAMILY MEDICINE

## 2024-08-20 PROCEDURE — 3074F SYST BP LT 130 MM HG: CPT | Performed by: FAMILY MEDICINE

## 2024-08-20 SDOH — ECONOMIC STABILITY: FOOD INSECURITY: WITHIN THE PAST 12 MONTHS, THE FOOD YOU BOUGHT JUST DIDN'T LAST AND YOU DIDN'T HAVE MONEY TO GET MORE.: SOMETIMES TRUE

## 2024-08-20 SDOH — ECONOMIC STABILITY: FOOD INSECURITY: WITHIN THE PAST 12 MONTHS, YOU WORRIED THAT YOUR FOOD WOULD RUN OUT BEFORE YOU GOT MONEY TO BUY MORE.: SOMETIMES TRUE

## 2024-08-20 SDOH — ECONOMIC STABILITY: INCOME INSECURITY: HOW HARD IS IT FOR YOU TO PAY FOR THE VERY BASICS LIKE FOOD, HOUSING, MEDICAL CARE, AND HEATING?: SOMEWHAT HARD

## 2024-08-20 ASSESSMENT — PATIENT HEALTH QUESTIONNAIRE - PHQ9
1. LITTLE INTEREST OR PLEASURE IN DOING THINGS: NOT AT ALL
SUM OF ALL RESPONSES TO PHQ QUESTIONS 1-9: 0
SUM OF ALL RESPONSES TO PHQ9 QUESTIONS 1 & 2: 0
2. FEELING DOWN, DEPRESSED OR HOPELESS: NOT AT ALL

## 2024-08-20 NOTE — PROGRESS NOTES
Chronic UTI     Colon polyps [id number]    Tubular adenoma in     Gastroesophageal reflux disease without esophagitis 2018    History of lumbar laminectomy 2018    L4/5 level    Hyperlipidemia     Hypothyroidism     Osteoarthritis     PONV (postoperative nausea and vomiting)     after epidural- nausea, vomiting and headache    Postlaminectomy syndrome, lumbar region 2018     Past Surgical History:   Procedure Laterality Date    ANKLE SURGERY      fracture  pins used    ANKLE SURGERY      times 3 rt ankle    BREAST SURGERY      reduction and reconstruction both breasts     SECTION  1995    CHOLECYSTECTOMY      COLONOSCOPY  2008    Diverticulosis, one polyp - tubular adenoma. Gets every 2 years     COLONOSCOPY  2012    Polyps. Needs reepat in     COLONOSCOPY  2014    COLONOSCOPY N/A 2022    COLONOSCOPY DIAGNOSTIC performed by Clint Dela Cruz MD at [facility]COPY    LUMBAR LAMINECTOMY  2016    L4-5    SD COLONOSCOPY FLX DX W/COLLJ SPEC WHEN PFRMD N/A 2018    COLONOSCOPY LOW  SCREENING performed by Clint Dela Cruz MD at [facility] ENDOSCOPY    SD EGD TRANSORAL BIOPSY SINGLE/MULTIPLE N/A 2018    EGD BIOPSY performed by Clint Dela Cruz MD at [facility] ENDOSCOPY    KALYN-EN-Y GASTRIC BYPASS  2019    done in [place]    TUBAL LIGATION      UPPER GASTROINTESTINAL ENDOSCOPY  2008    Hiatal hernia    UPPER GASTROINTESTINAL ENDOSCOPY  2012    Hiatal hernia    UPPER GASTROINTESTINAL ENDOSCOPY  2014    UPPER GASTROINTESTINAL ENDOSCOPY  2015    DR DELA CRUZ    UPPER GASTROINTESTINAL ENDOSCOPY N/A 2022    EGD BIOPSY performed by Clint Dela Cruz MD at [facility] ENDOSCOPY       Social History :  Social History       Tobacco History       Smoking Status  Never      Smokeless Tobacco Use  Never              Alcohol History       Alcohol Use Status  No              Drug Use       Drug Use Status  No              Sexual Activity

## 2024-08-20 NOTE — PATIENT INSTRUCTIONS
o [phone].   Sitio web: [email or web address].    [facility] TRANSIT SYSTEM:  Lo que ofrecen:transporte dentro del condado de [place] para personas de 60 años y más que no necesitan ayuda para subir al vehículo. Solo de janiya a janiya. El costo oscila entre los $2 y los $4 en cada sentido. Residentes de [place], [place], [place], [place], [place], Aruna Howard y [facility] viaje por $1.50 a $4 cada trayecto.  Teléfono: [phone]       [facility] TRANSPORTATION  Lo que ofrecen:transporte para residentes del condado de [facility] mayores de 60 años que no necesitan ayuda para entrar o salir del vehículo.  servicio para citas médicas, compras de comestibles, establecimientos de comidas o citas de servicios sociales. Donación de $5 de torito y vuelta  Teléfono:[phone], de lunes a viernes de 8:00 a. m. a 3:30 p. m.

## 2024-08-21 ENCOUNTER — HOSPITAL ENCOUNTER (OUTPATIENT)
Age: 64
Discharge: HOME | End: 2024-08-21
Payer: MEDICARE

## 2024-08-21 DIAGNOSIS — Z79.4 TYPE 2 DIABETES MELLITUS WITHOUT COMPLICATION, WITH LONG-TERM CURRENT USE OF INSULIN (HCC): ICD-10-CM

## 2024-08-21 DIAGNOSIS — Z11.4 SCREENING FOR HIV WITHOUT PRESENCE OF RISK FACTORS: ICD-10-CM

## 2024-08-21 DIAGNOSIS — E11.9 TYPE 2 DIABETES MELLITUS WITHOUT COMPLICATION, WITH LONG-TERM CURRENT USE OF INSULIN (HCC): ICD-10-CM

## 2024-08-21 DIAGNOSIS — E55.9 VITAMIN D DEFICIENCY: ICD-10-CM

## 2024-08-21 DIAGNOSIS — Z11.59 NEED FOR HEPATITIS C SCREENING TEST: ICD-10-CM

## 2024-08-21 DIAGNOSIS — E78.5 HYPERLIPIDEMIA WITH TARGET LDL LESS THAN 100: ICD-10-CM

## 2024-08-21 DIAGNOSIS — E55.9 VITAMIN D DEFICIENCY: Primary | ICD-10-CM

## 2024-08-21 DIAGNOSIS — E03.9 ACQUIRED HYPOTHYROIDISM: ICD-10-CM

## 2024-08-21 LAB
25(OH)D3 SERPL-MCNC: 14 NG/ML (ref 30–100)
ALBUMIN SERPL-MCNC: 4.6 G/DL (ref 3.5–5.2)
ALP SERPL-CCNC: 138 U/L (ref 35–104)
ALT SERPL-CCNC: 15 U/L (ref 0–32)
ANION GAP SERPL CALCULATED.3IONS-SCNC: 15 MMOL/L (ref 7–16)
AST SERPL-CCNC: 18 U/L (ref 0–31)
BASOPHILS # BLD: 0.02 K/UL (ref 0–0.2)
BASOPHILS NFR BLD: 1 % (ref 0–2)
BILIRUB SERPL-MCNC: 0.8 MG/DL (ref 0–1.2)
BUN SERPL-MCNC: 16 MG/DL (ref 6–23)
CALCIUM SERPL-MCNC: 9.7 MG/DL (ref 8.6–10.2)
CHLORIDE SERPL-SCNC: 103 MMOL/L (ref 98–107)
CHOLEST SERPL-MCNC: 272 MG/DL
CO2 SERPL-SCNC: 24 MMOL/L (ref 22–29)
CREAT SERPL-MCNC: 0.5 MG/DL (ref 0.5–1)
EOSINOPHIL # BLD: 0.1 K/UL (ref 0.05–0.5)
EOSINOPHILS RELATIVE PERCENT: 2 % (ref 0–6)
ERYTHROCYTE [DISTWIDTH] IN BLOOD BY AUTOMATED COUNT: 15.8 % (ref 11.5–15)
GFR, ESTIMATED: >90 ML/MIN/1.73M2
GLUCOSE SERPL-MCNC: 110 MG/DL (ref 74–99)
HCT VFR BLD AUTO: 43.6 % (ref 34–48)
HCV AB SERPL QL IA: NONREACTIVE
HDLC SERPL-MCNC: 111 MG/DL
HGB BLD-MCNC: 13.5 G/DL (ref 11.5–15.5)
HIV 1+2 AB+HIV1 P24 AG SERPL QL IA: NONREACTIVE
IMM GRANULOCYTES # BLD AUTO: <0.03 K/UL (ref 0–0.58)
IMM GRANULOCYTES NFR BLD: 0 % (ref 0–5)
LDLC SERPL CALC-MCNC: 145 MG/DL
LYMPHOCYTES NFR BLD: 1.72 K/UL (ref 1.5–4)
LYMPHOCYTES RELATIVE PERCENT: 42 % (ref 20–42)
MCH RBC QN AUTO: 25 PG (ref 26–35)
MCHC RBC AUTO-ENTMCNC: 31 G/DL (ref 32–34.5)
MCV RBC AUTO: 80.9 FL (ref 80–99.9)
MONOCYTES NFR BLD: 0.37 K/UL (ref 0.1–0.95)
MONOCYTES NFR BLD: 9 % (ref 2–12)
NEUTROPHILS NFR BLD: 46 % (ref 43–80)
NEUTS SEG NFR BLD: 1.9 K/UL (ref 1.8–7.3)
PLATELET # BLD AUTO: 259 K/UL (ref 130–450)
PMV BLD AUTO: 10.3 FL (ref 7–12)
POTASSIUM SERPL-SCNC: 3.3 MMOL/L (ref 3.5–5)
PROT SERPL-MCNC: 8.8 G/DL (ref 6.4–8.3)
RBC # BLD AUTO: 5.39 M/UL (ref 3.5–5.5)
SODIUM SERPL-SCNC: 142 MMOL/L (ref 132–146)
T4 FREE SERPL-MCNC: 1.3 NG/DL (ref 0.9–1.7)
TRIGL SERPL-MCNC: 80 MG/DL
TSH SERPL DL<=0.05 MIU/L-ACNC: 2.61 UIU/ML (ref 0.27–4.2)
VLDLC SERPL CALC-MCNC: 16 MG/DL
WBC OTHER # BLD: 4.1 K/UL (ref 4.5–11.5)

## 2024-08-21 PROCEDURE — 82306 VITAMIN D 25 HYDROXY: CPT

## 2024-08-21 PROCEDURE — 80061 LIPID PANEL: CPT

## 2024-08-21 PROCEDURE — 36415 COLL VENOUS BLD VENIPUNCTURE: CPT

## 2024-08-21 PROCEDURE — 84443 ASSAY THYROID STIM HORMONE: CPT

## 2024-08-21 PROCEDURE — 85025 COMPLETE CBC W/AUTO DIFF WBC: CPT

## 2024-08-21 PROCEDURE — 87389 HIV-1 AG W/HIV-1&-2 AB AG IA: CPT

## 2024-08-21 PROCEDURE — 80053 COMPREHEN METABOLIC PANEL: CPT

## 2024-08-21 PROCEDURE — 86803 HEPATITIS C AB TEST: CPT

## 2024-08-21 PROCEDURE — 84439 ASSAY OF FREE THYROXINE: CPT

## 2024-08-21 RX ORDER — ERGOCALCIFEROL 1.25 MG/1
50000 CAPSULE ORAL WEEKLY
Qty: 12 CAPSULE | Refills: 1 | Status: SHIPPED | OUTPATIENT
Start: 2024-08-21

## 2024-08-21 RX ORDER — ATORVASTATIN CALCIUM 20 MG
20 TABLET ORAL DAILY
Qty: 90 TABLET | Refills: 1 | Status: SHIPPED | OUTPATIENT
Start: 2024-08-21

## 2024-08-21 NOTE — TELEPHONE ENCOUNTER
Patient need refill   Per family ( and sister), she has been increasingly more confused over the past few months  Likely 2/2 side effect of brain metastasis s/p whole brain rads       Plan:  - delirium precautions   - avoid sedating medications

## 2024-08-22 ENCOUNTER — TELEPHONE (OUTPATIENT)
Age: 64
End: 2024-08-22

## 2024-08-22 NOTE — TELEPHONE ENCOUNTER
Patient called and states that Pharmacy did not want to give her Lipitor due to her insurance but she is unable to take the generic form due to it causing her tachycardia. Wants to know if you will change medication or if you still want her to have Lipitor and possibly do a Prior Auth?

## 2024-08-27 ENCOUNTER — TELEPHONE (OUTPATIENT)
Age: 64
End: 2024-08-27

## 2024-08-27 NOTE — TELEPHONE ENCOUNTER
Spoke to patient's insurance and they advised me that Lipitor had been approved for patient but it was only approved until 12/01/2024 and that once December came back around that we had to do what's called a \"Re-Authorization\" for the medication for the patient to have it dispensed again. Phone number they provided me with was [phone] to call in December.     Approval PA number is [id number] and Reference # is [id number].    Will call pharmacy now to let them know to go ahead and fill medication for patient. Patient will also be notified.

## 2024-09-03 ENCOUNTER — HOSPITAL ENCOUNTER (OUTPATIENT)
Dept: GENERAL RADIOLOGY | Age: 64
Discharge: HOME | End: 2024-09-05
Payer: MEDICAID

## 2024-09-03 DIAGNOSIS — Z12.31 BREAST CANCER SCREENING BY MAMMOGRAM: ICD-10-CM

## 2024-09-03 PROCEDURE — 77063 BREAST TOMOSYNTHESIS BI: CPT

## 2024-09-26 ENCOUNTER — OFFICE VISIT (OUTPATIENT)
Age: 64
End: 2024-09-26

## 2024-09-26 VITALS
OXYGEN SATURATION: 96 % | BODY MASS INDEX: 39.3 KG/M2 | DIASTOLIC BLOOD PRESSURE: 70 MMHG | HEIGHT: 65 IN | HEART RATE: 84 BPM | RESPIRATION RATE: 20 BRPM | SYSTOLIC BLOOD PRESSURE: 126 MMHG | TEMPERATURE: 97.9 F | WEIGHT: 235.9 LBS

## 2024-09-26 DIAGNOSIS — Z00.00 WELCOME TO MEDICARE PREVENTIVE VISIT: ICD-10-CM

## 2024-09-26 DIAGNOSIS — J40 BRONCHITIS: Primary | ICD-10-CM

## 2024-09-26 RX ORDER — AZITHROMYCIN 250 MG/1
TABLET, FILM COATED ORAL
Qty: 6 TABLET | Refills: 0 | Status: SHIPPED | OUTPATIENT
Start: 2024-09-26 | End: 2024-10-06

## 2024-09-26 RX ORDER — ALBUTEROL SULFATE 90 UG/1
2 INHALANT RESPIRATORY (INHALATION) 4 TIMES DAILY PRN
Qty: 18 G | Refills: 5 | Status: SHIPPED | OUTPATIENT
Start: 2024-09-26

## 2024-09-26 ASSESSMENT — ENCOUNTER SYMPTOMS
VOMITING: 0
NAUSEA: 0
SINUS PAIN: 0
SHORTNESS OF BREATH: 0
CHEST TIGHTNESS: 0
RHINORRHEA: 0
SINUS PRESSURE: 1
DIARRHEA: 0
COUGH: 1
ABDOMINAL PAIN: 0
CONSTIPATION: 0
WHEEZING: 0

## 2025-02-19 ENCOUNTER — TELEPHONE (OUTPATIENT)
Age: 65
End: 2025-02-19

## 2025-02-20 ENCOUNTER — OFFICE VISIT (OUTPATIENT)
Age: 65
End: 2025-02-20
Payer: MEDICARE

## 2025-02-20 VITALS
BODY MASS INDEX: 39.79 KG/M2 | SYSTOLIC BLOOD PRESSURE: 126 MMHG | RESPIRATION RATE: 16 BRPM | WEIGHT: 238.8 LBS | HEART RATE: 100 BPM | DIASTOLIC BLOOD PRESSURE: 79 MMHG | OXYGEN SATURATION: 97 % | HEIGHT: 65 IN | TEMPERATURE: 97.4 F

## 2025-02-20 DIAGNOSIS — E55.9 VITAMIN D DEFICIENCY: ICD-10-CM

## 2025-02-20 DIAGNOSIS — Z86.79 HISTORY OF HYPERTENSION: ICD-10-CM

## 2025-02-20 DIAGNOSIS — D50.9 IRON DEFICIENCY ANEMIA, UNSPECIFIED IRON DEFICIENCY ANEMIA TYPE: ICD-10-CM

## 2025-02-20 DIAGNOSIS — Z79.4 TYPE 2 DIABETES MELLITUS WITHOUT COMPLICATION, WITH LONG-TERM CURRENT USE OF INSULIN (HCC): Primary | ICD-10-CM

## 2025-02-20 DIAGNOSIS — E11.9 TYPE 2 DIABETES MELLITUS WITHOUT COMPLICATION, WITH LONG-TERM CURRENT USE OF INSULIN (HCC): Primary | ICD-10-CM

## 2025-02-20 DIAGNOSIS — Z86.39 HISTORY OF HYPOTHYROIDISM: ICD-10-CM

## 2025-02-20 DIAGNOSIS — K63.5 POLYP OF COLON, UNSPECIFIED PART OF COLON, UNSPECIFIED TYPE: ICD-10-CM

## 2025-02-20 DIAGNOSIS — K21.9 GASTROESOPHAGEAL REFLUX DISEASE WITHOUT ESOPHAGITIS: ICD-10-CM

## 2025-02-20 DIAGNOSIS — E78.5 HYPERLIPIDEMIA WITH TARGET LDL LESS THAN 100: ICD-10-CM

## 2025-02-20 LAB
BASOPHILS ABSOLUTE: 0.01 K/UL (ref 0–0.2)
BASOPHILS RELATIVE PERCENT: 0 % (ref 0–2)
EOSINOPHILS ABSOLUTE: 0.06 K/UL (ref 0.05–0.5)
EOSINOPHILS RELATIVE PERCENT: 1 % (ref 0–6)
HBA1C MFR BLD: 6.1 %
HCT VFR BLD CALC: 44.3 % (ref 34–48)
HEMOGLOBIN: 13.3 G/DL (ref 11.5–15.5)
IMMATURE GRANULOCYTES %: 0 % (ref 0–5)
IMMATURE GRANULOCYTES ABSOLUTE: <0.03 K/UL (ref 0–0.58)
LYMPHOCYTES ABSOLUTE: 1.86 K/UL (ref 1.5–4)
LYMPHOCYTES RELATIVE PERCENT: 37 % (ref 20–42)
MCH RBC QN AUTO: 25.6 PG (ref 26–35)
MCHC RBC AUTO-ENTMCNC: 30 G/DL (ref 32–34.5)
MCV RBC AUTO: 85.2 FL (ref 80–99.9)
MONOCYTES ABSOLUTE: 0.45 K/UL (ref 0.1–0.95)
MONOCYTES RELATIVE PERCENT: 9 % (ref 2–12)
NEUTROPHILS ABSOLUTE: 2.64 K/UL (ref 1.8–7.3)
NEUTROPHILS RELATIVE PERCENT: 53 % (ref 43–80)
PDW BLD-RTO: 14.8 % (ref 11.5–15)
PLATELET # BLD: 237 K/UL (ref 130–450)
PMV BLD AUTO: 10.7 FL (ref 7–12)
RBC # BLD: 5.2 M/UL (ref 3.5–5.5)
WBC # BLD: 5 K/UL (ref 4.5–11.5)

## 2025-02-20 PROCEDURE — 3017F COLORECTAL CA SCREEN DOC REV: CPT | Performed by: FAMILY MEDICINE

## 2025-02-20 PROCEDURE — 83036 HEMOGLOBIN GLYCOSYLATED A1C: CPT | Performed by: FAMILY MEDICINE

## 2025-02-20 PROCEDURE — 1036F TOBACCO NON-USER: CPT | Performed by: FAMILY MEDICINE

## 2025-02-20 PROCEDURE — 1090F PRES/ABSN URINE INCON ASSESS: CPT | Performed by: FAMILY MEDICINE

## 2025-02-20 PROCEDURE — 1123F ACP DISCUSS/DSCN MKR DOCD: CPT | Performed by: FAMILY MEDICINE

## 2025-02-20 PROCEDURE — 3078F DIAST BP <80 MM HG: CPT | Performed by: FAMILY MEDICINE

## 2025-02-20 PROCEDURE — G2211 COMPLEX E/M VISIT ADD ON: HCPCS | Performed by: FAMILY MEDICINE

## 2025-02-20 PROCEDURE — 36415 COLL VENOUS BLD VENIPUNCTURE: CPT | Performed by: FAMILY MEDICINE

## 2025-02-20 PROCEDURE — G8417 CALC BMI ABV UP PARAM F/U: HCPCS | Performed by: FAMILY MEDICINE

## 2025-02-20 PROCEDURE — 3044F HG A1C LEVEL LT 7.0%: CPT | Performed by: FAMILY MEDICINE

## 2025-02-20 PROCEDURE — G8400 PT W/DXA NO RESULTS DOC: HCPCS | Performed by: FAMILY MEDICINE

## 2025-02-20 PROCEDURE — 99214 OFFICE O/P EST MOD 30 MIN: CPT | Performed by: FAMILY MEDICINE

## 2025-02-20 PROCEDURE — 3074F SYST BP LT 130 MM HG: CPT | Performed by: FAMILY MEDICINE

## 2025-02-20 PROCEDURE — G8427 DOCREV CUR MEDS BY ELIG CLIN: HCPCS | Performed by: FAMILY MEDICINE

## 2025-02-20 PROCEDURE — 2022F DILAT RTA XM EVC RTNOPTHY: CPT | Performed by: FAMILY MEDICINE

## 2025-02-20 RX ORDER — ATORVASTATIN CALCIUM 20 MG
20 TABLET ORAL DAILY
Qty: 90 TABLET | Refills: 1 | Status: SHIPPED | OUTPATIENT
Start: 2025-02-20

## 2025-02-20 RX ORDER — OMEPRAZOLE 40 MG/1
40 CAPSULE, DELAYED RELEASE ORAL DAILY
Qty: 90 CAPSULE | Refills: 1 | Status: SHIPPED | OUTPATIENT
Start: 2025-02-20

## 2025-02-20 SDOH — ECONOMIC STABILITY: FOOD INSECURITY: WITHIN THE PAST 12 MONTHS, THE FOOD YOU BOUGHT JUST DIDN'T LAST AND YOU DIDN'T HAVE MONEY TO GET MORE.: SOMETIMES TRUE

## 2025-02-20 SDOH — ECONOMIC STABILITY: FOOD INSECURITY: WITHIN THE PAST 12 MONTHS, YOU WORRIED THAT YOUR FOOD WOULD RUN OUT BEFORE YOU GOT MONEY TO BUY MORE.: SOMETIMES TRUE

## 2025-02-20 ASSESSMENT — PATIENT HEALTH QUESTIONNAIRE - PHQ9
SUM OF ALL RESPONSES TO PHQ QUESTIONS 1-9: 1
1. LITTLE INTEREST OR PLEASURE IN DOING THINGS: SEVERAL DAYS
SUM OF ALL RESPONSES TO PHQ9 QUESTIONS 1 & 2: 1
SUM OF ALL RESPONSES TO PHQ QUESTIONS 1-9: 1
2. FEELING DOWN, DEPRESSED OR HOPELESS: NOT AT ALL

## 2025-02-20 NOTE — PATIENT INSTRUCTIONS
[facility]- Clint Merchant MD  [address] [place] [address]  [phone]     Virtual Counseling:   [facility]  Cerebral   Talk Space  Talkiatry  Regain  [facility]  Bright side      [facility]    [facility]  [address] [facility] [address]  Phone: [phone]    [facility]  [address]  [facility] [address]  Phone: [phone]    [facility]  [address] [place] [address]  Phone: [phone]    [facility]  [address]  [address]  [facility] [address]  Phone: [phone]    [facility]  [address] [facility] [address]  Phone: [phone]    [facility]  [address] [facility] [address]  [facility] [address]  Phone: [phone]    [facility]  [address] [facility] [address]  Phone: [phone]    [facility]  [address] [facility] [address]  Phone: [phone]    [facility]  [address] [id number]  Phone: [phone]    [facility] location (Español disponible)  [address] [facility] [address]  Phone: [phone]    [facility] location (Español disponible)  [address] [facility] [address]  Phone: [phone]    [facility]  [address] [facility] [address] [facility] [address]  Phone: [phone]    [facility] [place] location  [address] [place], [address]  Phone: [phone]    [facility] location  [address] [facility] [address] [facility] [address]  [facility] [address]  Phone: [phone]    [facility]    [facility] (Español disponible- Ann Hernandezcoscooby- Solo terapia de chuy)   [address] [place] [address]  [phone]    [facility]  [address]  [facility] [address]  Phone:

## 2025-02-20 NOTE — PROGRESS NOTES
[facility]  DATE OF VISIT : 2025    Patient : Dimple Parks   Age : 64 y.o.    : 1960   MRN : [id number]   ______________________________________________________________________    Chief Complaint :   Chief Complaint   Patient presents with    Follow-up     6 month Follow-Up/A1C 6.1       HPI : Dimple Parks is 64 y.o. female who presented to the clinic today for OV.     History of Gastric Bypass 2018.  Had a total weightloss of 60-lbs after surgry but has gained at least 20lbs back.      H/o HTN: Previously on Enalapril 10mg, Lasix 20mg daily, stopped after her bypass 5yrs ago.     HLD: Lipitor 20mg daily.      H/O NIDDM: Previously on Metormin 500mg BID, stopped after her bypass about 5yrs ago. Last A1C 6.0.     H/O Hypothyroidism: Previously Levothyroxine 100mg daily, stopped after her bypass about 5yrs ago.       GERD: Prilosec 40mg daily.      Iron def anemia: started after surgery. Has been following with Heme/Onc with Dr. Bentley. Had recent iron infusions last in January, at the time Hg 8.0, most recent 14.0.      H/O Lumbosacral Radiculopathy: s/p fusion in 2016. Previously following with Neurology. Was doing well on lyrica 100mg BID, was taken off after surgery due to improvement.       I reviewed the patient's past medications, allergies and past medical history during this visit.    Past Medical History :    Past Medical History:   Diagnosis Date    Anemia     Chronic low back pain 2018    Chronic UTI     Colon polyps [id number]    Tubular adenoma in     Gastroesophageal reflux disease without esophagitis 2018    History of lumbar laminectomy 2018    L4/5 level    Hyperlipidemia     Hypothyroidism     Osteoarthritis     PONV (postoperative nausea and vomiting)     after epidural- nausea, vomiting and headache    Postlaminectomy syndrome, lumbar region 2018     Past Surgical History:   Procedure Laterality Date    ANKLE SURGERY      fracture

## 2025-02-21 DIAGNOSIS — E55.9 VITAMIN D DEFICIENCY: ICD-10-CM

## 2025-02-21 LAB
T4 FREE: 1.2 NG/DL (ref 0.9–1.7)
TSH SERPL DL<=0.05 MIU/L-ACNC: 1.42 UIU/ML (ref 0.27–4.2)
VITAMIN D 25-HYDROXY: 18.2 NG/ML (ref 30–100)

## 2025-02-21 RX ORDER — ERGOCALCIFEROL 1.25 MG/1
50000 CAPSULE, LIQUID FILLED ORAL WEEKLY
Qty: 12 CAPSULE | Refills: 1 | Status: SHIPPED | OUTPATIENT
Start: 2025-02-21

## 2025-03-21 ENCOUNTER — OFFICE VISIT (OUTPATIENT)
Dept: OBGYN | Age: 65
End: 2025-03-21
Payer: MEDICARE

## 2025-03-21 VITALS
DIASTOLIC BLOOD PRESSURE: 56 MMHG | TEMPERATURE: 97.9 F | BODY MASS INDEX: 39.71 KG/M2 | OXYGEN SATURATION: 98 % | WEIGHT: 238.6 LBS | HEART RATE: 77 BPM | SYSTOLIC BLOOD PRESSURE: 117 MMHG

## 2025-03-21 DIAGNOSIS — R10.2 PELVIC PAIN: ICD-10-CM

## 2025-03-21 DIAGNOSIS — Z01.419 WELL WOMAN EXAM: Primary | ICD-10-CM

## 2025-03-21 DIAGNOSIS — Z12.4 SCREENING FOR CERVICAL CANCER: ICD-10-CM

## 2025-03-21 PROCEDURE — 1159F MED LIST DOCD IN RCRD: CPT | Performed by: FAMILY MEDICINE

## 2025-03-21 PROCEDURE — G0101 CA SCREEN;PELVIC/BREAST EXAM: HCPCS | Performed by: FAMILY MEDICINE

## 2025-03-21 PROCEDURE — 3078F DIAST BP <80 MM HG: CPT | Performed by: FAMILY MEDICINE

## 2025-03-21 PROCEDURE — 3074F SYST BP LT 130 MM HG: CPT | Performed by: FAMILY MEDICINE

## 2025-03-21 NOTE — PROGRESS NOTES
Patient alert and pleasant with no complaints  Here today for annual GYN visit. [facility]  #[id number], Pattie,available via iPAD for interpretation.  Pelvic exam completed, pap smear obtained, labeled and sent to lab.   Discharge instructions have been discussed with the patient. Patient advised to call our office with any questions or concerns.   Voiced understanding.   
shape, consistency and nontender,   Adnexa: normal adnexa in size, nontender and no masses.  Perineum: normal appearing without lesions or masses  Anus: normal appearing without lesions or masses, no fissures or hemorrhoids        An electronic signature was used to authenticate this note.    --Emiliana Delacruz MD

## 2025-03-25 ENCOUNTER — HOSPITAL ENCOUNTER (OUTPATIENT)
Dept: ULTRASOUND IMAGING | Age: 65
Discharge: HOME | End: 2025-03-27
Payer: MEDICARE

## 2025-03-25 DIAGNOSIS — R10.2 PELVIC PAIN: ICD-10-CM

## 2025-03-25 PROCEDURE — 76830 TRANSVAGINAL US NON-OB: CPT

## 2025-03-27 LAB
GYNECOLOGY CYTOLOGY REPORT: NORMAL
HPV SAMPLE: NORMAL
HPV SOURCE: NORMAL
HPV, GENOTYPE 16: NOT DETECTED
HPV, GENOTYPE 18: NOT DETECTED
HPV, HIGH RISK OTHER: NOT DETECTED
HPV, INTERPRETATION: NORMAL

## 2025-03-28 ENCOUNTER — RESULTS FOLLOW-UP (OUTPATIENT)
Dept: OBGYN | Age: 65
End: 2025-03-28

## 2025-04-10 NOTE — PROGRESS NOTES
Dimple Parks (:  1960) is a 64 y.o. female,Established patient, here for evaluation of the following chief complaint(s):  Ultrasound (Doctor ordered a vaginal ultrasound and patient states she needs another one and possibly a urine test. /Patient currently being treated for very low hemoglobin and is scheduled to see her hematologist/oncologist on 25. )         Assessment & Plan  Pelvic pain  Currently resolved. Ovaries not visualized on US due to bowel in the way. Pt is undergoing work up for constipation. Discussed can repeat US pending this workup and if pain returns  Will check Ucx    Orders:    Culture, Urine      Follow up prn symptoms do not improve       Subjective   HPI  Follow up   Pelvic pain resolved, US results reviewed previously   Would like checked for UTI  + constipation, on iron   Has colonoscopy and endoscopy scheduled   Using laxatives     Review of Systems    Per HPI    Objective   Physical Exam     Vitals:    25 1113   BP: 137/62   Pulse: 75      General Appearance:    Alert, cooperative, no distress, appears stated age  Head:    Normocephalic, without obvious abnormality, atraumatic  Lungs:     Respirations unlabored  Heart:    Regular rate and rhythm  Breast Exam:  Deferred  Abdomen:     Soft, non-tender, no masses, no organomegaly  Pelvic Exam deferred       An electronic signature was used to authenticate this note.    --Emiliana Delacruz MD

## 2025-04-11 ENCOUNTER — OFFICE VISIT (OUTPATIENT)
Dept: OBGYN | Age: 65
End: 2025-04-11
Payer: MEDICARE

## 2025-04-11 VITALS
SYSTOLIC BLOOD PRESSURE: 137 MMHG | DIASTOLIC BLOOD PRESSURE: 62 MMHG | HEART RATE: 75 BPM | BODY MASS INDEX: 39.27 KG/M2 | WEIGHT: 236 LBS

## 2025-04-11 DIAGNOSIS — R10.2 PELVIC PAIN: Primary | ICD-10-CM

## 2025-04-11 PROCEDURE — 1123F ACP DISCUSS/DSCN MKR DOCD: CPT | Performed by: FAMILY MEDICINE

## 2025-04-11 PROCEDURE — G8427 DOCREV CUR MEDS BY ELIG CLIN: HCPCS | Performed by: FAMILY MEDICINE

## 2025-04-11 PROCEDURE — 1036F TOBACCO NON-USER: CPT | Performed by: FAMILY MEDICINE

## 2025-04-11 PROCEDURE — 3017F COLORECTAL CA SCREEN DOC REV: CPT | Performed by: FAMILY MEDICINE

## 2025-04-11 PROCEDURE — 99212 OFFICE O/P EST SF 10 MIN: CPT | Performed by: FAMILY MEDICINE

## 2025-04-11 PROCEDURE — G8417 CALC BMI ABV UP PARAM F/U: HCPCS | Performed by: FAMILY MEDICINE

## 2025-04-11 PROCEDURE — 1159F MED LIST DOCD IN RCRD: CPT | Performed by: FAMILY MEDICINE

## 2025-04-11 PROCEDURE — 3078F DIAST BP <80 MM HG: CPT | Performed by: FAMILY MEDICINE

## 2025-04-11 PROCEDURE — G8400 PT W/DXA NO RESULTS DOC: HCPCS | Performed by: FAMILY MEDICINE

## 2025-04-11 PROCEDURE — 3075F SYST BP GE 130 - 139MM HG: CPT | Performed by: FAMILY MEDICINE

## 2025-04-11 PROCEDURE — 1090F PRES/ABSN URINE INCON ASSESS: CPT | Performed by: FAMILY MEDICINE

## 2025-04-13 LAB
CULTURE: ABNORMAL
SPECIMEN DESCRIPTION: ABNORMAL

## 2025-04-14 ENCOUNTER — RESULTS FOLLOW-UP (OUTPATIENT)
Dept: OBGYN | Age: 65
End: 2025-04-14

## 2025-04-24 ENCOUNTER — OFFICE VISIT (OUTPATIENT)
Dept: SURGERY | Age: 65
End: 2025-04-24
Payer: MEDICARE

## 2025-04-24 VITALS
DIASTOLIC BLOOD PRESSURE: 72 MMHG | WEIGHT: 235 LBS | RESPIRATION RATE: 16 BRPM | HEART RATE: 80 BPM | SYSTOLIC BLOOD PRESSURE: 109 MMHG | OXYGEN SATURATION: 97 % | BODY MASS INDEX: 39.15 KG/M2 | HEIGHT: 65 IN

## 2025-04-24 DIAGNOSIS — R14.0 BLOATING: ICD-10-CM

## 2025-04-24 DIAGNOSIS — R10.10 PAIN OF UPPER ABDOMEN: Primary | ICD-10-CM

## 2025-04-24 LAB
DIABETIC RETINOPATHY: NEGATIVE
DIABETIC RETINOPATHY: NEGATIVE

## 2025-04-24 PROCEDURE — 3078F DIAST BP <80 MM HG: CPT | Performed by: SURGERY

## 2025-04-24 PROCEDURE — 3074F SYST BP LT 130 MM HG: CPT | Performed by: SURGERY

## 2025-04-24 PROCEDURE — 99214 OFFICE O/P EST MOD 30 MIN: CPT | Performed by: SURGERY

## 2025-04-24 PROCEDURE — 1036F TOBACCO NON-USER: CPT | Performed by: SURGERY

## 2025-04-24 PROCEDURE — G8427 DOCREV CUR MEDS BY ELIG CLIN: HCPCS | Performed by: SURGERY

## 2025-04-24 PROCEDURE — 1090F PRES/ABSN URINE INCON ASSESS: CPT | Performed by: SURGERY

## 2025-04-24 PROCEDURE — 1123F ACP DISCUSS/DSCN MKR DOCD: CPT | Performed by: SURGERY

## 2025-04-24 PROCEDURE — G8417 CALC BMI ABV UP PARAM F/U: HCPCS | Performed by: SURGERY

## 2025-04-24 PROCEDURE — 3017F COLORECTAL CA SCREEN DOC REV: CPT | Performed by: SURGERY

## 2025-04-24 PROCEDURE — 99212 OFFICE O/P EST SF 10 MIN: CPT | Performed by: SURGERY

## 2025-04-24 PROCEDURE — 1159F MED LIST DOCD IN RCRD: CPT | Performed by: SURGERY

## 2025-04-24 PROCEDURE — G8400 PT W/DXA NO RESULTS DOC: HCPCS | Performed by: SURGERY

## 2025-04-24 RX ORDER — SODIUM, POTASSIUM,MAG SULFATES 17.5-3.13G
SOLUTION, RECONSTITUTED, ORAL ORAL
Qty: 2 EACH | Refills: 0 | Status: SHIPPED | OUTPATIENT
Start: 2025-04-24

## 2025-04-24 NOTE — PROGRESS NOTES
tenderness, guarding, rebound, or rigidity.  There was no                     masses, hepatosplenomegaly, or hernias.  No inguinal hernias were noted on coughing and straining.  Rectal -deferred  MSK: no clubbing/ no cyanosis/ gait normal    Assessment & Plan   Assessment/Plan:            I have reviewed the prior progress note from the patient's FM provider visit on 2/20/25  .       I have reviewed the following labs:  CBC with Differential:    Lab Results   Component Value Date/Time    WBC 5.0 02/20/2025 10:48 AM    RBC 5.20 02/20/2025 10:48 AM    HGB 13.3 02/20/2025 10:48 AM    HCT 44.3 02/20/2025 10:48 AM     02/20/2025 10:48 AM    MCV 85.2 02/20/2025 10:48 AM    MCH 25.6 02/20/2025 10:48 AM    MCHC 30.0 02/20/2025 10:48 AM    RDW 14.8 02/20/2025 10:48 AM    LYMPHOPCT 37 02/20/2025 10:48 AM    MONOPCT 9 02/20/2025 10:48 AM    EOSPCT 1 02/20/2025 10:48 AM    BASOPCT 0 02/20/2025 10:48 AM    MONOSABS 0.45 02/20/2025 10:48 AM    LYMPHSABS 1.86 02/20/2025 10:48 AM    EOSABS 0.06 02/20/2025 10:48 AM    BASOSABS 0.01 02/20/2025 10:48 AM     CMP:    Lab Results   Component Value Date/Time     08/21/2024 07:26 AM    K 3.3 08/21/2024 07:26 AM     08/21/2024 07:26 AM    CO2 24 08/21/2024 07:26 AM    BUN 16 08/21/2024 07:26 AM    CREATININE 0.5 08/21/2024 07:26 AM    GFRAA >60 03/30/2016 05:00 PM    LABGLOM >90 08/21/2024 07:26 AM    GLUCOSE 110 08/21/2024 07:26 AM    CALCIUM 9.7 08/21/2024 07:26 AM    BILITOT 0.8 08/21/2024 07:26 AM    ALKPHOS 138 08/21/2024 07:26 AM    AST 18 08/21/2024 07:26 AM    ALT 15 08/21/2024 07:26 AM     HgBA1c:    Lab Results   Component Value Date/Time    LABA1C 6.1 02/20/2025 10:49 AM      --Reviewed Ultrasound of Lower abdomen and my interpretation is overlying bowel gas     --Abdominal pain in mid epigastrium  Last EGD was in 2022  Hx of LRNYGB--increased risk for marginal ulcers  I have discussed the risks, benefits, and alternatives to esophagogastroduodenoscopy with

## 2025-04-24 NOTE — PATIENT INSTRUCTIONS
If you have any questions, please call Elisha at [phone]    [facility]  SUPREP  COLON PREP FOR COLONOSCOPY OR COLON SURGERY    It is very important that you follow all of the instructions listed on this sheet carefully (they may be slightly different than the directions on the product that you purchase at the pharmacy) to ensure that your colon is adequately cleaned out or your risk of complications could be increased.    2 Days or More Before Endoscopy:  Obtain SUPREP from the pharmacy.    Do not eat corn, tomatoes, peas or watermelon 3 to 5 days before procedure.  Start clear liquid diet 2 days before the procedure  If you are on INSULIN or OTHER DIABETIC MEDICATIONS, then check with your primary care physician as to how to adjust your medication while on clear liquid diet and when nothing by mouth.     1 Day Before the Endoscopy:  No solid food - only clear liquids (soup, jello, or juice that you can see through with no solid food) for breakfast, lunch and supper.  DO NOT drink or eat anything that is red as it will turn the inside of the colon red and look like blood.    Have at least 8 oz or more of clear liquids for breakfast (7 am to 8 am) and lunch (11:30 am to 12:30 pm).  1:00 pm Drink at least 8 oz of clear liquids.  3:00 pm Drink at least 8 oz of clear liquids.  4:00 pm Pour one  6-oz bottle of SUPREP into the mixing container and add cold water to the 16 oz line and mix  Drink the 1st SUPREP mixture followed immediately by at least 16 oz of clear liquids.  6:00 pm Drink at least 16 oz of clear liquids.  Can continue to take  clear liquids all night    Day of Endoscopy:  4 hours prior to scheduled time for colonoscopy, Pour 2nd 6-oz bottle of SUPREP into the mixing container and add cold water to the 16 oz line and mix.  Drink the 2nd SUPREP mixture followed immediately by at least 16 oz of clear liquids.    STOP ALL CLEAR LIQUIDS 2 HOURS PRIOR TO SCHEDULED TIME  If any

## 2025-06-02 ENCOUNTER — TELEPHONE (OUTPATIENT)
Age: 65
End: 2025-06-02

## 2025-06-02 ENCOUNTER — PREP FOR PROCEDURE (OUTPATIENT)
Age: 65
End: 2025-06-02

## 2025-06-02 DIAGNOSIS — R14.0 BLOATING: ICD-10-CM

## 2025-06-02 DIAGNOSIS — R10.10 UPPER ABDOMINAL PAIN: ICD-10-CM

## 2025-06-02 NOTE — TELEPHONE ENCOUNTER
MA Scheduled pt for EGD/Colonoscopy on 07/07/2025 at 0830. Pt needs to arrive at [facility] at 0730. Using [place] interpretor, Patient confirmed date and time. Address and directions given.     Electronically signed by Elisha Hobson MA on 6/2/25 at 9:53 AM EDT

## 2025-06-30 ENCOUNTER — TELEPHONE (OUTPATIENT)
Age: 65
End: 2025-06-30

## 2025-06-30 NOTE — PROGRESS NOTES
[facility] PRE-ADMISSION TESTING   COLONOSCOPY INSTRUCTIONS  PAT- Phone Number: [phone]    COLONOSCOPY INSTRUCTIONS:     [x] Bowel Prep instructions reviewed - any questions regarding your prep, please contact surgeon's office.  [x] Colonoscopy: 1-2 days prior: Clear liquids only - nothing red or purple in color.  [x] Antibacterial Soap Shower Night before AND the morning of procedure.  [x] No solid food after midnight. You may have SIPS of clear liquids up until 2 hours before your arrival time to the hospital.   [x] Do not wear makeup, lotions, powders, deodorant.   [x] No tobacco products, illegal drugs, or alcohol within 24 hours of your surgery.  [x] Jewelry or valuables should not be brought to the hospital. All body and/or tongue piercing's must be removed prior to arriving to hospital. No contact lens or hair pins.   [] Urine Pregnancy test will be preformed the day of surgery. A specimen sample may be brought from home.  [x] Arrange transportation with a responsible adult  to and from the hospital. If you do not have a responsible adult  to transport you, you will need to make arrangements with a medical transportation company. Arrange for someone to be with you for the remainder of the day and for 24 hours after your procedure due to having had anesthesia.    -Who will be your  for transportation? neighbor  -Who will be staying with you for 24 hrs after your procedure? neighbor  [x] Bring insurance card and photo ID.  [] Bring copy of living will or healthcare power of  papers to be placed in your electronic record.    PARKING INSTRUCTIONS:     [x] ARRIVAL DATE & TIME: 7/7 @ 0700  [x] Times are subject to change. We will contact you the business day before surgery if that were to occur.  [x] Enter into the [facility] Entrance. Two people may accompany you. Masks are not required.  [x] Parking Lot \"I\" is where you will park. It is located

## 2025-07-07 ENCOUNTER — HOSPITAL ENCOUNTER (OUTPATIENT)
Age: 65
Setting detail: OUTPATIENT SURGERY
Discharge: HOME | End: 2025-07-07
Attending: SURGERY | Admitting: SURGERY
Payer: MEDICARE

## 2025-07-07 ENCOUNTER — ANESTHESIA EVENT (OUTPATIENT)
Dept: ENDOSCOPY | Age: 65
End: 2025-07-07
Payer: MEDICARE

## 2025-07-07 ENCOUNTER — ANESTHESIA (OUTPATIENT)
Dept: ENDOSCOPY | Age: 65
End: 2025-07-07
Payer: MEDICARE

## 2025-07-07 VITALS
WEIGHT: 235 LBS | RESPIRATION RATE: 16 BRPM | TEMPERATURE: 97 F | HEART RATE: 74 BPM | BODY MASS INDEX: 39.15 KG/M2 | SYSTOLIC BLOOD PRESSURE: 107 MMHG | HEIGHT: 65 IN | OXYGEN SATURATION: 94 % | DIASTOLIC BLOOD PRESSURE: 74 MMHG

## 2025-07-07 PROCEDURE — 6360000002 HC RX W HCPCS

## 2025-07-07 PROCEDURE — 3609017100 HC EGD: Performed by: SURGERY

## 2025-07-07 PROCEDURE — 3700000000 HC ANESTHESIA ATTENDED CARE: Performed by: SURGERY

## 2025-07-07 PROCEDURE — 3700000001 HC ADD 15 MINUTES (ANESTHESIA): Performed by: SURGERY

## 2025-07-07 PROCEDURE — 2709999900 HC NON-CHARGEABLE SUPPLY: Performed by: SURGERY

## 2025-07-07 PROCEDURE — 43235 EGD DIAGNOSTIC BRUSH WASH: CPT | Performed by: SURGERY

## 2025-07-07 PROCEDURE — 7100000010 HC PHASE II RECOVERY - FIRST 15 MIN: Performed by: SURGERY

## 2025-07-07 PROCEDURE — 3609027000 HC COLONOSCOPY: Performed by: SURGERY

## 2025-07-07 PROCEDURE — 2580000003 HC RX 258: Performed by: SURGERY

## 2025-07-07 PROCEDURE — 7100000011 HC PHASE II RECOVERY - ADDTL 15 MIN: Performed by: SURGERY

## 2025-07-07 PROCEDURE — 45378 DIAGNOSTIC COLONOSCOPY: CPT | Performed by: SURGERY

## 2025-07-07 RX ORDER — SODIUM CHLORIDE 9 MG/ML
INJECTION, SOLUTION INTRAVENOUS CONTINUOUS
Status: DISCONTINUED | OUTPATIENT
Start: 2025-07-07 | End: 2025-07-07 | Stop reason: HOSPADM

## 2025-07-07 RX ORDER — SODIUM CHLORIDE 0.9 % (FLUSH) 0.9 %
5-40 SYRINGE (ML) INJECTION EVERY 12 HOURS SCHEDULED
Status: DISCONTINUED | OUTPATIENT
Start: 2025-07-07 | End: 2025-07-07 | Stop reason: HOSPADM

## 2025-07-07 RX ORDER — PROPOFOL 10 MG/ML
INJECTION, EMULSION INTRAVENOUS
Status: DISCONTINUED | OUTPATIENT
Start: 2025-07-07 | End: 2025-07-07 | Stop reason: SDUPTHER

## 2025-07-07 RX ORDER — SODIUM CHLORIDE 0.9 % (FLUSH) 0.9 %
5-40 SYRINGE (ML) INJECTION PRN
Status: DISCONTINUED | OUTPATIENT
Start: 2025-07-07 | End: 2025-07-07 | Stop reason: HOSPADM

## 2025-07-07 RX ORDER — ONDANSETRON 2 MG/ML
INJECTION INTRAMUSCULAR; INTRAVENOUS
Status: DISCONTINUED | OUTPATIENT
Start: 2025-07-07 | End: 2025-07-07 | Stop reason: SDUPTHER

## 2025-07-07 RX ORDER — LIDOCAINE HYDROCHLORIDE 20 MG/ML
INJECTION, SOLUTION INTRAVENOUS
Status: DISCONTINUED | OUTPATIENT
Start: 2025-07-07 | End: 2025-07-07 | Stop reason: SDUPTHER

## 2025-07-07 RX ORDER — SODIUM CHLORIDE 9 MG/ML
INJECTION, SOLUTION INTRAVENOUS PRN
Status: DISCONTINUED | OUTPATIENT
Start: 2025-07-07 | End: 2025-07-07 | Stop reason: HOSPADM

## 2025-07-07 RX ADMIN — SODIUM CHLORIDE: 0.9 INJECTION, SOLUTION INTRAVENOUS at 07:27

## 2025-07-07 RX ADMIN — LIDOCAINE HYDROCHLORIDE 100 MG: 20 INJECTION, SOLUTION INTRAVENOUS at 08:22

## 2025-07-07 RX ADMIN — PROPOFOL 320 MG: 10 INJECTION, EMULSION INTRAVENOUS at 08:22

## 2025-07-07 RX ADMIN — ONDANSETRON HYDROCHLORIDE 4 MG: 2 SOLUTION INTRAMUSCULAR; INTRAVENOUS at 08:22

## 2025-07-07 ASSESSMENT — PAIN SCALES - GENERAL: PAINLEVEL_OUTOF10: 0

## 2025-07-07 ASSESSMENT — LIFESTYLE VARIABLES: SMOKING_STATUS: 0

## 2025-07-07 ASSESSMENT — PAIN - FUNCTIONAL ASSESSMENT: PAIN_FUNCTIONAL_ASSESSMENT: 0-10

## 2025-07-07 NOTE — ANESTHESIA POSTPROCEDURE EVALUATION
Department of Anesthesiology  Postprocedure Note    Patient: Dimple Parks  MRN: [id number]  YOB: 1960  Date of evaluation: 7/7/2025    Procedure Summary       Date: 07/07/25 Room / Location: [place] [address] / [facility]    Anesthesia Start:  Anesthesia Stop:     Procedures:       ESOPHAGOGASTRODUODENOSCOPY POSSIBLE BIOPSY      COLONOSCOPY DIAGNOSTIC Diagnosis:       Bloating      Upper abdominal pain      (Bloating [R14.0])      (Upper abdominal pain [R10.10])    Surgeons: Clint Merchant MD Responsible Provider: Julio C Pritchard MD    Anesthesia Type: MAC ASA Status: 3            Anesthesia Type: MAC    Micheline Phase I:      Micheline Phase II:      Anesthesia Post Evaluation    Patient location during evaluation: PACU  Patient participation: complete - patient participated  Level of consciousness: awake  Pain score: 3  Airway patency: patent  Nausea & Vomiting: no nausea and no vomiting  Cardiovascular status: blood pressure returned to baseline  Respiratory status: acceptable  Hydration status: euvolemic      No notable events documented.

## 2025-07-07 NOTE — ANESTHESIA PRE PROCEDURE
lb)     Body mass index is 39.11 kg/m².    CBC:   Lab Results   Component Value Date/Time    WBC 5.0 02/20/2025 10:48 AM    RBC 5.20 02/20/2025 10:48 AM    HGB 13.3 02/20/2025 10:48 AM    HCT 44.3 02/20/2025 10:48 AM    MCV 85.2 02/20/2025 10:48 AM    RDW 14.8 02/20/2025 10:48 AM     02/20/2025 10:48 AM       CMP:   Lab Results   Component Value Date/Time     08/21/2024 07:26 AM    K 3.3 08/21/2024 07:26 AM     08/21/2024 07:26 AM    CO2 24 08/21/2024 07:26 AM    BUN 16 08/21/2024 07:26 AM    CREATININE 0.5 08/21/2024 07:26 AM    GFRAA >60 03/30/2016 05:00 PM    LABGLOM >90 08/21/2024 07:26 AM    GLUCOSE 110 08/21/2024 07:26 AM    CALCIUM 9.7 08/21/2024 07:26 AM    BILITOT 0.8 08/21/2024 07:26 AM    ALKPHOS 138 08/21/2024 07:26 AM    AST 18 08/21/2024 07:26 AM    ALT 15 08/21/2024 07:26 AM       POC Tests: No results for input(s): \"POCGLU\", \"POCNA\", \"POCK\", \"POCCL\", \"POCBUN\", \"POCHEMO\", \"POCHCT\" in the last 72 hours.    Coags:   Lab Results   Component Value Date/Time    PROTIME 11.5 03/16/2016 10:26 AM    INR 1.0 03/16/2016 10:26 AM    APTT 29.5 03/16/2016 10:26 AM       HCG (If Applicable): No results found for: \"PREGTESTUR\", \"PREGSERUM\", \"HCG\", \"HCGQUANT\"     ABGs: No results found for: \"PHART\", \"PO2ART\", \"GYQ8FUU\", \"WTJ5CPV\", \"BEART\", \"Q4XUICMS\"     Type & Screen (If Applicable):  No results found for: \"LABABO\"    Anesthesia Evaluation  Patient summary reviewed and Nursing notes reviewed   history of anesthetic complications: PONV.  Airway: Mallampati: II  TM distance: >3 FB   Neck ROM: full  Mouth opening: > = 3 FB   Dental: normal exam         Pulmonary: breath sounds clear to auscultation  (+)     sleep apnea: on CPAP,           (-) not a current smoker                           Cardiovascular:  Exercise tolerance: no interval change  (+) hypertension:, hyperlipidemia        Rhythm: regular  Rate: normal                    Neuro/Psych:   (+) neuromuscular disease (states hx of neuropathy):

## 2025-07-07 NOTE — H&P
EGD BIOPSY performed by Clint Dela Cruz MD at [facility] END[facility]COPY    KALYN-EN-Y GASTRIC BYPASS   03/19/2019     done in [place]    TUBAL LIGATION        UPPER GASTROINTESTINAL ENDOSCOPY   01/01/2008     Hiatal hernia    UPPER GASTROINTESTINAL ENDOSCOPY   01/01/2012     Hiatal hernia    UPPER GASTROINTESTINAL ENDOSCOPY   04/16/2014    UPPER GASTROINTESTINAL ENDOSCOPY   04/06/2015     DR DELA CRUZ    UPPER GASTROINTESTINAL ENDOSCOPY N/A 05/16/2022     EGD BIOPSY performed by Clint Dela Cruz MD at [facility] END[facility]COPY            Current Facility-Administered Medications          Current Outpatient Medications   Medication Sig Dispense Refill    vitamin D (ERGOCALCIFEROL) 1.25 MG (58769 UT) CAPS capsule Take 1 capsule by mouth once a week 12 capsule 1    LIPITOR 20 MG tablet Take 1 tablet by mouth daily for cholesterol 90 tablet 1    vitamin D (CHOLECALCIFEROL) 25 MCG (1000 UT) TABS tablet Take 1 tablet by mouth daily 90 tablet 1    omeprazole (PRILOSEC) 40 MG delayed release capsule Take 1 capsule by mouth daily 90 capsule 1    albuterol sulfate HFA (VENTOLIN HFA) 108 (90 Base) MCG/ACT inhaler Inhale 2 puffs into the lungs 4 times daily as needed for Wheezing 18 g 5    ferrous sulfate (IRON 325) 325 (65 Fe) MG tablet Take 1 tablet by mouth daily (with breakfast)        Multiple Vitamins-Iron (MULTI-VITAMIN/IRON PO) Take by mouth daily          No current facility-administered medications for this visit.            Allergies        Allergies   Allergen Reactions    Flagyl [Metronidazole] Rash            Family History         Family History   Problem Relation Age of Onset    Cancer Mother           Leukemia    Diabetes Mother      High Blood Pressure Mother      Thyroid Disease Mother      Diabetes Father      Heart Disease Father      Cervical Cancer Sister      Thyroid Cancer Sister      Cancer Maternal Grandmother           Throat, Lung    Thyroid Disease Daughter      Seizures Daughter      Cancer Nephew           renal

## 2025-07-07 NOTE — CONSULTS
Session ID: [id number]  Session Duration: Longer than 54 minutes  Language: Armenian   ID: #[id number]   Name: Eugene

## 2025-07-17 ENCOUNTER — OFFICE VISIT (OUTPATIENT)
Age: 65
End: 2025-07-17
Payer: MEDICARE

## 2025-07-17 VITALS
TEMPERATURE: 98 F | HEART RATE: 75 BPM | DIASTOLIC BLOOD PRESSURE: 66 MMHG | OXYGEN SATURATION: 94 % | HEIGHT: 65 IN | WEIGHT: 238.2 LBS | BODY MASS INDEX: 39.69 KG/M2 | SYSTOLIC BLOOD PRESSURE: 109 MMHG | RESPIRATION RATE: 16 BRPM

## 2025-07-17 DIAGNOSIS — E66.01 MORBID (SEVERE) OBESITY DUE TO EXCESS CALORIES (HCC): ICD-10-CM

## 2025-07-17 DIAGNOSIS — Z86.79 HISTORY OF HYPERTENSION: ICD-10-CM

## 2025-07-17 DIAGNOSIS — D50.9 IRON DEFICIENCY ANEMIA, UNSPECIFIED IRON DEFICIENCY ANEMIA TYPE: ICD-10-CM

## 2025-07-17 DIAGNOSIS — Z98.84 HISTORY OF DIABETES MELLITUS RESOLVED FOLLOWING BARIATRIC SURGERY: Primary | ICD-10-CM

## 2025-07-17 DIAGNOSIS — Z78.0 POST-MENOPAUSAL: ICD-10-CM

## 2025-07-17 DIAGNOSIS — Z86.39 HISTORY OF DIABETES MELLITUS RESOLVED FOLLOWING BARIATRIC SURGERY: Primary | ICD-10-CM

## 2025-07-17 DIAGNOSIS — K21.9 GASTROESOPHAGEAL REFLUX DISEASE WITHOUT ESOPHAGITIS: ICD-10-CM

## 2025-07-17 DIAGNOSIS — Z12.31 BREAST CANCER SCREENING BY MAMMOGRAM: ICD-10-CM

## 2025-07-17 DIAGNOSIS — Z86.39 HISTORY OF HYPOTHYROIDISM: ICD-10-CM

## 2025-07-17 DIAGNOSIS — E55.9 VITAMIN D DEFICIENCY: ICD-10-CM

## 2025-07-17 DIAGNOSIS — Z00.00 INITIAL MEDICARE ANNUAL WELLNESS VISIT: ICD-10-CM

## 2025-07-17 DIAGNOSIS — E78.5 HYPERLIPIDEMIA WITH TARGET LDL LESS THAN 100: ICD-10-CM

## 2025-07-17 LAB — HBA1C MFR BLD: 6.2 %

## 2025-07-17 PROCEDURE — G8400 PT W/DXA NO RESULTS DOC: HCPCS | Performed by: FAMILY MEDICINE

## 2025-07-17 PROCEDURE — G8427 DOCREV CUR MEDS BY ELIG CLIN: HCPCS | Performed by: FAMILY MEDICINE

## 2025-07-17 PROCEDURE — 1036F TOBACCO NON-USER: CPT | Performed by: FAMILY MEDICINE

## 2025-07-17 PROCEDURE — 99214 OFFICE O/P EST MOD 30 MIN: CPT | Performed by: FAMILY MEDICINE

## 2025-07-17 PROCEDURE — 3017F COLORECTAL CA SCREEN DOC REV: CPT | Performed by: FAMILY MEDICINE

## 2025-07-17 PROCEDURE — 3074F SYST BP LT 130 MM HG: CPT | Performed by: FAMILY MEDICINE

## 2025-07-17 PROCEDURE — 1090F PRES/ABSN URINE INCON ASSESS: CPT | Performed by: FAMILY MEDICINE

## 2025-07-17 PROCEDURE — 83036 HEMOGLOBIN GLYCOSYLATED A1C: CPT | Performed by: FAMILY MEDICINE

## 2025-07-17 PROCEDURE — G8417 CALC BMI ABV UP PARAM F/U: HCPCS | Performed by: FAMILY MEDICINE

## 2025-07-17 PROCEDURE — 1160F RVW MEDS BY RX/DR IN RCRD: CPT | Performed by: FAMILY MEDICINE

## 2025-07-17 PROCEDURE — 3078F DIAST BP <80 MM HG: CPT | Performed by: FAMILY MEDICINE

## 2025-07-17 PROCEDURE — 1123F ACP DISCUSS/DSCN MKR DOCD: CPT | Performed by: FAMILY MEDICINE

## 2025-07-17 PROCEDURE — G0438 PPPS, INITIAL VISIT: HCPCS | Performed by: FAMILY MEDICINE

## 2025-07-17 PROCEDURE — 1159F MED LIST DOCD IN RCRD: CPT | Performed by: FAMILY MEDICINE

## 2025-07-17 RX ORDER — IBUPROFEN 800 MG/1
800 TABLET, FILM COATED ORAL 2 TIMES DAILY PRN
Qty: 180 TABLET | Refills: 1 | Status: SHIPPED | OUTPATIENT
Start: 2025-07-17

## 2025-07-17 RX ORDER — ERGOCALCIFEROL 1.25 MG/1
50000 CAPSULE, LIQUID FILLED ORAL WEEKLY
Qty: 12 CAPSULE | Refills: 3 | Status: SHIPPED | OUTPATIENT
Start: 2025-07-17

## 2025-07-17 RX ORDER — ATORVASTATIN CALCIUM 20 MG
20 TABLET ORAL DAILY
Qty: 90 TABLET | Refills: 3 | Status: SHIPPED | OUTPATIENT
Start: 2025-07-17

## 2025-07-17 ASSESSMENT — ENCOUNTER SYMPTOMS
ABDOMINAL PAIN: 0
SHORTNESS OF BREATH: 0
NAUSEA: 0
WHEEZING: 0
CONSTIPATION: 0
VOMITING: 0
COUGH: 0
DIARRHEA: 0

## 2025-07-17 ASSESSMENT — PATIENT HEALTH QUESTIONNAIRE - PHQ9
2. FEELING DOWN, DEPRESSED OR HOPELESS: NOT AT ALL
SUM OF ALL RESPONSES TO PHQ QUESTIONS 1-9: 0
1. LITTLE INTEREST OR PLEASURE IN DOING THINGS: NOT AT ALL
SUM OF ALL RESPONSES TO PHQ QUESTIONS 1-9: 0

## 2025-07-17 ASSESSMENT — LIFESTYLE VARIABLES
HOW OFTEN DO YOU HAVE A DRINK CONTAINING ALCOHOL: NEVER
HOW MANY STANDARD DRINKS CONTAINING ALCOHOL DO YOU HAVE ON A TYPICAL DAY: 1 OR 2

## 2025-07-17 NOTE — PATIENT INSTRUCTIONS
information.    Personalized Preventive Plan for Dimple Parks - 7/17/2025  Medicare offers a range of preventive health benefits. Some of the tests and screenings are paid in full while other may be subject to a deductible, co-insurance, and/or copay.  Some of these benefits include a comprehensive review of your medical history including lifestyle, illnesses that may run in your family, and various assessments and screenings as appropriate.  After reviewing your medical record and screening and assessments performed today your provider may have ordered immunizations, labs, imaging, and/or referrals for you.  A list of these orders (if applicable) as well as your Preventive Care list are included within your After Visit Summary for your review.

## 2025-07-17 NOTE — PROGRESS NOTES
[facility]  DATE OF VISIT : 2025    Patient : Dimple Parks   Age : 64 y.o.    : 1960   MRN : [id number]   ______________________________________________________________________    Chief Complaint :   Chief Complaint   Patient presents with    Medicare AWV     AWV    Follow-up     6 month Follow-Up       HPI : Dimple Parks is 64 y.o. female who presented to the clinic today for OV.     History of Gastric Bypass 2018.  Had a total weightloss of 60-lbs after surgry but has gained at least 20lbs back.      H/o HTN: Previously on Enalapril 10mg, Lasix 20mg daily, stopped after her bypass 5yrs ago. Has recently been having hypotension episodes SBP as low as 73 has only been occurring after her iron infusions.  Denies experiencing any lightheadedness, dizziness, presyncope.       HLD: Lipitor 20mg daily.      H/O NIDDM: Previously on Metormin 500mg BID, stopped after her bypass about 5yrs ago. Last A1C 6.1 (25).     H/O Hypothyroidism: Previously Levothyroxine 100mg daily, stopped after her bypass about 5yrs ago.       GERD: Prilosec 40mg daily.      Iron def anemia: started after surgery. Has been following with Heme/Onc with Dr. Bentley. Has upcoming appt on .      H/O Lumbosacral Radiculopathy: s/p fusion in 2016. Previously following with Neurology. Was doing well on lyrica 100mg BID, was taken off after surgery due to improvement.       I reviewed the patient's past medications, allergies and past medical history during this visit.    Past Medical History :    Past Medical History:   Diagnosis Date    Anemia     Chronic low back pain 2018    Chronic UTI     Colon polyps [phone]    Tubular adenoma in     Gastroesophageal reflux disease without esophagitis 2018    History of lumbar laminectomy 2018    L4/5 level    Hyperlipidemia     Hypothyroidism     Osteoarthritis     PONV (postoperative nausea and vomiting)     after epidural- nausea, vomiting 
Shanta Hairston MD as PCP - Empaneled Provider     Recommendations for Preventive Services Due: see orders and patient instructions/AVS.  Recommended screening schedule for the next 5-10 years is provided to the patient in written form: see Patient Instructions/AVS.     Reviewed and updated this visit:  Tobacco  Allergies  Meds  Problems  Med Hx  Surg Hx  Fam Hx

## 2025-08-18 ENCOUNTER — OFFICE VISIT (OUTPATIENT)
Age: 65
End: 2025-08-18
Payer: MEDICARE

## 2025-08-18 VITALS
RESPIRATION RATE: 18 BRPM | SYSTOLIC BLOOD PRESSURE: 146 MMHG | HEART RATE: 85 BPM | DIASTOLIC BLOOD PRESSURE: 75 MMHG | BODY MASS INDEX: 39.49 KG/M2 | OXYGEN SATURATION: 95 % | WEIGHT: 237 LBS | TEMPERATURE: 97.6 F | HEIGHT: 65 IN

## 2025-08-18 DIAGNOSIS — Z78.0 POST-MENOPAUSAL: ICD-10-CM

## 2025-08-18 DIAGNOSIS — G47.33 OBSTRUCTIVE SLEEP APNEA: Primary | ICD-10-CM

## 2025-08-18 DIAGNOSIS — Z13.820 SCREENING FOR OSTEOPOROSIS: ICD-10-CM

## 2025-08-18 DIAGNOSIS — K59.00 CONSTIPATION, UNSPECIFIED CONSTIPATION TYPE: ICD-10-CM

## 2025-08-18 DIAGNOSIS — M96.1 POSTLAMINECTOMY SYNDROME, LUMBAR REGION: Chronic | ICD-10-CM

## 2025-08-18 DIAGNOSIS — E55.9 VITAMIN D DEFICIENCY: ICD-10-CM

## 2025-08-18 DIAGNOSIS — K21.9 GASTROESOPHAGEAL REFLUX DISEASE WITHOUT ESOPHAGITIS: ICD-10-CM

## 2025-08-18 DIAGNOSIS — Z12.31 ENCOUNTER FOR SCREENING MAMMOGRAM FOR MALIGNANT NEOPLASM OF BREAST: ICD-10-CM

## 2025-08-18 DIAGNOSIS — M54.50 CHRONIC BILATERAL LOW BACK PAIN, UNSPECIFIED WHETHER SCIATICA PRESENT: Chronic | ICD-10-CM

## 2025-08-18 DIAGNOSIS — E78.5 HYPERLIPIDEMIA WITH TARGET LDL LESS THAN 100: ICD-10-CM

## 2025-08-18 DIAGNOSIS — G89.29 CHRONIC BILATERAL LOW BACK PAIN, UNSPECIFIED WHETHER SCIATICA PRESENT: Chronic | ICD-10-CM

## 2025-08-18 PROCEDURE — G8427 DOCREV CUR MEDS BY ELIG CLIN: HCPCS

## 2025-08-18 PROCEDURE — 3017F COLORECTAL CA SCREEN DOC REV: CPT

## 2025-08-18 PROCEDURE — 1159F MED LIST DOCD IN RCRD: CPT

## 2025-08-18 PROCEDURE — G8417 CALC BMI ABV UP PARAM F/U: HCPCS

## 2025-08-18 PROCEDURE — G8400 PT W/DXA NO RESULTS DOC: HCPCS

## 2025-08-18 PROCEDURE — 1090F PRES/ABSN URINE INCON ASSESS: CPT

## 2025-08-18 PROCEDURE — 1036F TOBACCO NON-USER: CPT

## 2025-08-18 PROCEDURE — 3078F DIAST BP <80 MM HG: CPT

## 2025-08-18 PROCEDURE — 1123F ACP DISCUSS/DSCN MKR DOCD: CPT

## 2025-08-18 PROCEDURE — 99214 OFFICE O/P EST MOD 30 MIN: CPT

## 2025-08-18 PROCEDURE — 3077F SYST BP >= 140 MM HG: CPT

## 2025-08-18 RX ORDER — POLYETHYLENE GLYCOL 3350 17 G/17G
17 POWDER, FOR SOLUTION ORAL DAILY
Qty: 30 EACH | Refills: 3 | Status: SHIPPED | OUTPATIENT
Start: 2025-08-18 | End: 2025-11-16

## 2025-08-18 RX ORDER — OMEPRAZOLE 40 MG/1
40 CAPSULE, DELAYED RELEASE ORAL DAILY
Qty: 90 CAPSULE | Refills: 1 | Status: SHIPPED | OUTPATIENT
Start: 2025-08-18

## 2025-08-18 RX ORDER — ATORVASTATIN CALCIUM 20 MG
20 TABLET ORAL DAILY
Qty: 90 TABLET | Refills: 3 | Status: SHIPPED | OUTPATIENT
Start: 2025-08-18

## 2025-08-18 RX ORDER — ERGOCALCIFEROL 1.25 MG/1
50000 CAPSULE, LIQUID FILLED ORAL WEEKLY
Qty: 12 CAPSULE | Refills: 3 | Status: SHIPPED | OUTPATIENT
Start: 2025-08-18

## 2025-08-18 RX ORDER — SENNOSIDES 8.6 MG/1
1 TABLET ORAL 2 TIMES DAILY
Qty: 60 TABLET | Refills: 11 | Status: CANCELLED | OUTPATIENT
Start: 2025-08-18 | End: 2026-08-18

## 2025-08-18 RX ORDER — ALBUTEROL SULFATE 90 UG/1
2 INHALANT RESPIRATORY (INHALATION) 4 TIMES DAILY PRN
Qty: 18 G | Refills: 5 | Status: CANCELLED | OUTPATIENT
Start: 2025-08-18

## 2025-08-18 RX ORDER — IBUPROFEN 800 MG/1
800 TABLET, FILM COATED ORAL 2 TIMES DAILY PRN
Qty: 180 TABLET | Refills: 1 | Status: SHIPPED | OUTPATIENT
Start: 2025-08-18

## 2025-08-18 ASSESSMENT — ENCOUNTER SYMPTOMS
RESPIRATORY NEGATIVE: 1
GASTROINTESTINAL NEGATIVE: 1

## 2025-08-18 ASSESSMENT — LIFESTYLE VARIABLES
HOW MANY STANDARD DRINKS CONTAINING ALCOHOL DO YOU HAVE ON A TYPICAL DAY: PATIENT DOES NOT DRINK
HOW OFTEN DO YOU HAVE A DRINK CONTAINING ALCOHOL: NEVER

## 2025-08-19 ASSESSMENT — SLEEP AND FATIGUE QUESTIONNAIRES
HOW LIKELY ARE YOU TO NOD OFF OR FALL ASLEEP IN A CAR, WHILE STOPPED FOR A FEW MINUTES IN TRAFFIC: WOULD NEVER DOZE
HOW LIKELY ARE YOU TO NOD OFF OR FALL ASLEEP WHILE LYING DOWN TO REST IN THE AFTERNOON WHEN CIRCUMSTANCES PERMIT: MODERATE CHANCE OF DOZING
HOW LIKELY ARE YOU TO NOD OFF OR FALL ASLEEP WHILE SITTING AND TALKING TO SOMEONE: SLIGHT CHANCE OF DOZING
HOW LIKELY ARE YOU TO NOD OFF OR FALL ASLEEP WHEN YOU ARE A PASSENGER IN A CAR FOR AN HOUR WITHOUT A BREAK: MODERATE CHANCE OF DOZING
HOW LIKELY ARE YOU TO NOD OFF OR FALL ASLEEP WHILE WATCHING TV: SLIGHT CHANCE OF DOZING
ESS TOTAL SCORE: 8
HOW LIKELY ARE YOU TO NOD OFF OR FALL ASLEEP WHILE SITTING QUIETLY AFTER LUNCH WITHOUT ALCOHOL: WOULD NEVER DOZE
HOW LIKELY ARE YOU TO NOD OFF OR FALL ASLEEP WHILE SITTING AND READING: SLIGHT CHANCE OF DOZING
HOW LIKELY ARE YOU TO NOD OFF OR FALL ASLEEP WHILE SITTING INACTIVE IN A PUBLIC PLACE: SLIGHT CHANCE OF DOZING

## (undated) DEVICE — FORCEPS BX L160CM JAW DIA2.4MM YEL L CAP W/ NDL DISP RAD

## (undated) DEVICE — BITEBLOCK 54FR W/ DENT RIM BLOX

## (undated) DEVICE — ENDOSCOPIC KIT 1.1+ OP4 NO CP DE

## (undated) DEVICE — SPONGE GZ W4XL4IN RAYON POLY FILL CVR W/ NONWOVEN FAB

## (undated) DEVICE — Z DISCONTINUED NO SUB IDED TUBING ETCO2 AD L6.5FT NSL ORAL CVD PRNG NONFLARED TIP OVR

## (undated) DEVICE — CONTAINER SPEC 60ML PH 7NEUTRAL BUFF FRMLN RDY TO USE

## (undated) DEVICE — CONNECTOR IRRIGATION AUXILIARY H2O JET W/ PRT MTL THRD HYDR

## (undated) DEVICE — CANNULA NSL ORAL AD FOR CAPNOFLEX CO2 O2 AIRLFE

## (undated) DEVICE — GAUZE,SPONGE,4"X4",8PLY,STRL,LF,10/TRAY: Brand: MEDLINE

## (undated) DEVICE — DEFENDO AIR WATER SUCTION AND BIOPSY VALVE KIT: Brand: DEFENDO AIR/WATER/SUCTION AND BIOPSY VALVE

## (undated) DEVICE — SYRINGE MED 50ML LUERSLIP TIP

## (undated) DEVICE — DEFENDO AIR WATER SUCTION AND BIOPSY VALVE KIT FOR  OLYMPUS: Brand: DEFENDO AIR/WATER/SUCTION AND BIOPSY VALVE

## (undated) DEVICE — AIR/WATER CLEANING ADAPTER FOR OLYMPUS® GI ENDOSCOPE: Brand: BULLDOG®